# Patient Record
Sex: FEMALE | Race: WHITE | NOT HISPANIC OR LATINO | Employment: OTHER | URBAN - METROPOLITAN AREA
[De-identification: names, ages, dates, MRNs, and addresses within clinical notes are randomized per-mention and may not be internally consistent; named-entity substitution may affect disease eponyms.]

---

## 2017-11-28 ENCOUNTER — TELEPHONE (OUTPATIENT)
Dept: PSYCHIATRY | Facility: CLINIC | Age: 62
End: 2017-11-28

## 2018-01-05 ENCOUNTER — TELEPHONE (OUTPATIENT)
Dept: PSYCHIATRY | Facility: CLINIC | Age: 63
End: 2018-01-05

## 2019-10-16 DIAGNOSIS — R39.15 URINARY URGENCY: ICD-10-CM

## 2019-10-16 DIAGNOSIS — N39.41 URGE INCONTINENCE: Primary | ICD-10-CM

## 2019-10-16 DIAGNOSIS — N39.3 FEMALE STRESS INCONTINENCE: ICD-10-CM

## 2020-01-10 ENCOUNTER — CLINICAL SUPPORT (OUTPATIENT)
Dept: REHABILITATION | Facility: HOSPITAL | Age: 65
End: 2020-01-10
Payer: MEDICARE

## 2020-01-10 DIAGNOSIS — R29.898 WEAKNESS OF BOTH HIPS: ICD-10-CM

## 2020-01-10 PROCEDURE — 97161 PT EVAL LOW COMPLEX 20 MIN: CPT | Mod: PN

## 2020-01-10 PROCEDURE — 97530 THERAPEUTIC ACTIVITIES: CPT | Mod: PN

## 2020-01-10 PROCEDURE — 97112 NEUROMUSCULAR REEDUCATION: CPT | Mod: PN

## 2020-03-23 ENCOUNTER — DOCUMENTATION ONLY (OUTPATIENT)
Dept: REHABILITATION | Facility: OTHER | Age: 65
End: 2020-03-23

## 2020-03-23 PROBLEM — R29.898 WEAKNESS OF BOTH HIPS: Status: RESOLVED | Noted: 2020-01-10 | Resolved: 2020-03-23

## 2020-03-23 NOTE — PROGRESS NOTES
Outpatient Therapy Discharge Summary     Name: Sandi Dobson  [facility] Number: [id number]    Therapy Diagnosis: No diagnosis found.  Physician: No ref. provider found    Physician Orders: PT Eval and Treat   Medical Diagnosis:   N39.3 (ICD-10-CM) - Female stress incontinence   N39.41 (ICD-10-CM) - Urge incontinence   R39.15 (ICD-10-CM) - Urgency of urination   Evaluation Date: 1/10/2020        Date of Last visit: 1/10/20  Total Visits Received: 1  Cancelled Visits: 0  No Show Visits: 2    Assessment    Goals: attended eval only    Discharge reason: did not return for therapy    Plan   This patient is discharged from PT

## 2020-06-16 LAB
ALBUMIN/GLOBULIN RATIO: 1.4
ALBUMIN: 4.3
ALP ISOS SERPL LEV INH-CCNC: 101 U/L
ALT: 16
AST: 20
BILIRUBIN TOTAL: 0.3
BUN/CREAT SERPL: 26 (ref ?–26)
CALCIUM SERPL-MCNC: 9.3 MG/DL
CHLORIDE: 106 MMOL/L
CHOLESTEROL, TOTAL: 207 (ref ?–207)
CO2 SERPL-SCNC: 25 MMOL/L
CREAT SERPL-MCNC: 1.2 MG/DL (ref ?–1.2)
EST. GFR  (NON AFRICAN AMERICAN): 48 ML/MIN/1.73 M2 (ref 48–?)
GLOBULIN: 3.1
GLUCOSE: 70
HBA1C MFR BLD: 6.1 % (ref ?–6.1)
HDLC SERPL-MCNC: 53 MG/DL
LDLC SERPL CALC-MCNC: 127 MG/DL (ref ?–127)
NON HDL CHOL (CALC): 154 (ref ?–154)
POTASSIUM: 4.5 MMOL/L
PROTEIN TOTAL: 7.4
SODIUM: 142 MMOL/L
TRIGL SERPL-MCNC: 157 MG/DL (ref ?–157)
UREA NITROGEN (BUN): 31 (ref ?–31)

## 2021-01-15 ENCOUNTER — PATIENT OUTREACH (OUTPATIENT)
Dept: ADMINISTRATIVE | Facility: HOSPITAL | Age: 66
End: 2021-01-15

## 2021-01-15 DIAGNOSIS — Z12.39 ENCOUNTER FOR SCREENING FOR MALIGNANT NEOPLASM OF BREAST, UNSPECIFIED SCREENING MODALITY: Primary | ICD-10-CM

## 2021-01-15 DIAGNOSIS — Z11.59 NEED FOR HEPATITIS C SCREENING TEST: ICD-10-CM

## 2021-01-15 DIAGNOSIS — Z12.31 ENCOUNTER FOR SCREENING MAMMOGRAM FOR MALIGNANT NEOPLASM OF BREAST: ICD-10-CM

## 2021-01-15 DIAGNOSIS — M81.0 OSTEOPOROSIS, UNSPECIFIED OSTEOPOROSIS TYPE, UNSPECIFIED PATHOLOGICAL FRACTURE PRESENCE: ICD-10-CM

## 2021-01-19 ENCOUNTER — TELEPHONE (OUTPATIENT)
Dept: FAMILY MEDICINE | Facility: CLINIC | Age: 66
End: 2021-01-19

## 2021-01-29 ENCOUNTER — LAB VISIT (OUTPATIENT)
Dept: LAB | Facility: HOSPITAL | Age: 66
End: 2021-01-29
Attending: INTERNAL MEDICINE
Payer: MEDICARE

## 2021-01-29 ENCOUNTER — OFFICE VISIT (OUTPATIENT)
Dept: FAMILY MEDICINE | Facility: CLINIC | Age: 66
End: 2021-01-29
Payer: MEDICARE

## 2021-01-29 VITALS
SYSTOLIC BLOOD PRESSURE: 120 MMHG | HEART RATE: 75 BPM | DIASTOLIC BLOOD PRESSURE: 60 MMHG | TEMPERATURE: 98 F | HEIGHT: 72 IN | OXYGEN SATURATION: 96 %

## 2021-01-29 DIAGNOSIS — E11.65 UNCONTROLLED TYPE 2 DIABETES MELLITUS WITH HYPERGLYCEMIA: ICD-10-CM

## 2021-01-29 DIAGNOSIS — Z86.010 HISTORY OF COLONIC POLYPS: ICD-10-CM

## 2021-01-29 DIAGNOSIS — F39 MOOD DISORDER: ICD-10-CM

## 2021-01-29 DIAGNOSIS — I10 ESSENTIAL HYPERTENSION: ICD-10-CM

## 2021-01-29 DIAGNOSIS — E11.65 UNCONTROLLED TYPE 2 DIABETES MELLITUS WITH HYPERGLYCEMIA: Primary | ICD-10-CM

## 2021-01-29 PROBLEM — Z86.0100 HISTORY OF COLONIC POLYPS: Status: ACTIVE | Noted: 2021-01-29

## 2021-01-29 LAB
BASOPHILS # BLD AUTO: 0.02 K/UL (ref 0–0.2)
BASOPHILS NFR BLD: 0.3 % (ref 0–1.9)
DIFFERENTIAL METHOD: ABNORMAL
EOSINOPHIL # BLD AUTO: 0.1 K/UL (ref 0–0.5)
EOSINOPHIL NFR BLD: 1.5 % (ref 0–8)
ERYTHROCYTE [DISTWIDTH] IN BLOOD BY AUTOMATED COUNT: 14.1 % (ref 11.5–14.5)
HCT VFR BLD AUTO: 42.3 % (ref 37–48.5)
HGB BLD-MCNC: 12.6 G/DL (ref 12–16)
IMM GRANULOCYTES # BLD AUTO: 0.01 K/UL (ref 0–0.04)
IMM GRANULOCYTES NFR BLD AUTO: 0.2 % (ref 0–0.5)
LYMPHOCYTES # BLD AUTO: 1.9 K/UL (ref 1–4.8)
LYMPHOCYTES NFR BLD: 32 % (ref 18–48)
MCH RBC QN AUTO: 27.3 PG (ref 27–31)
MCHC RBC AUTO-ENTMCNC: 29.8 G/DL (ref 32–36)
MCV RBC AUTO: 92 FL (ref 82–98)
MONOCYTES # BLD AUTO: 0.5 K/UL (ref 0.3–1)
MONOCYTES NFR BLD: 9 % (ref 4–15)
NEUTROPHILS # BLD AUTO: 3.4 K/UL (ref 1.8–7.7)
NEUTROPHILS NFR BLD: 57 % (ref 38–73)
NRBC BLD-RTO: 0 /100 WBC
PLATELET # BLD AUTO: 288 K/UL (ref 150–350)
PMV BLD AUTO: 10.6 FL (ref 9.2–12.9)
RBC # BLD AUTO: 4.61 M/UL (ref 4–5.4)
WBC # BLD AUTO: 6 K/UL (ref 3.9–12.7)

## 2021-01-29 PROCEDURE — 80061 LIPID PANEL: CPT

## 2021-01-29 PROCEDURE — 84443 ASSAY THYROID STIM HORMONE: CPT

## 2021-01-29 PROCEDURE — 83036 HEMOGLOBIN GLYCOSYLATED A1C: CPT

## 2021-01-29 PROCEDURE — 99213 OFFICE O/P EST LOW 20 MIN: CPT | Mod: PBBFAC,PO | Performed by: INTERNAL MEDICINE

## 2021-01-29 PROCEDURE — 85025 COMPLETE CBC W/AUTO DIFF WBC: CPT

## 2021-01-29 PROCEDURE — 80053 COMPREHEN METABOLIC PANEL: CPT

## 2021-01-29 PROCEDURE — 99205 PR OFFICE/OUTPT VISIT, NEW, LEVL V, 60-74 MIN: ICD-10-PCS | Mod: S$PBB,,, | Performed by: INTERNAL MEDICINE

## 2021-01-29 PROCEDURE — 99205 OFFICE O/P NEW HI 60 MIN: CPT | Mod: S$PBB,,, | Performed by: INTERNAL MEDICINE

## 2021-01-29 PROCEDURE — 99999 PR PBB SHADOW E&M-EST. PATIENT-LVL III: CPT | Mod: PBBFAC,,, | Performed by: INTERNAL MEDICINE

## 2021-01-29 PROCEDURE — 99999 PR PBB SHADOW E&M-EST. PATIENT-LVL III: ICD-10-PCS | Mod: PBBFAC,,, | Performed by: INTERNAL MEDICINE

## 2021-01-29 PROCEDURE — 36415 COLL VENOUS BLD VENIPUNCTURE: CPT | Mod: PO

## 2021-01-29 RX ORDER — VENLAFAXINE HYDROCHLORIDE 150 MG/1
CAPSULE, EXTENDED RELEASE ORAL
Qty: 270 CAPSULE | Refills: 12 | Status: SHIPPED | OUTPATIENT
Start: 2021-01-29

## 2021-01-29 RX ORDER — AMLODIPINE BESYLATE 5 MG/1
5 TABLET ORAL DAILY PRN
Qty: 90 TABLET | Refills: 90 | Status: SHIPPED | OUTPATIENT
Start: 2021-01-29 | End: 2021-01-29 | Stop reason: SDUPTHER

## 2021-01-29 RX ORDER — VENLAFAXINE HYDROCHLORIDE 150 MG/1
150 CAPSULE, EXTENDED RELEASE ORAL
COMMUNITY
End: 2021-01-29 | Stop reason: SDUPTHER

## 2021-01-29 RX ORDER — GLIPIZIDE 10 MG/1
10 TABLET ORAL DAILY
Qty: 90 TABLET | Refills: 12 | Status: SHIPPED | OUTPATIENT
Start: 2021-01-29 | End: 2022-11-14 | Stop reason: SDUPTHER

## 2021-01-29 RX ORDER — AMLODIPINE BESYLATE 5 MG/1
5 TABLET ORAL DAILY PRN
Qty: 90 TABLET | Refills: 90 | Status: SHIPPED | OUTPATIENT
Start: 2021-01-29 | End: 2023-05-26

## 2021-01-29 RX ORDER — GLIPIZIDE 10 MG/1
10 TABLET ORAL DAILY
Qty: 90 TABLET | Refills: 12 | Status: SHIPPED | OUTPATIENT
Start: 2021-01-29 | End: 2021-01-29 | Stop reason: SDUPTHER

## 2021-01-29 RX ORDER — OMEPRAZOLE 10 MG/1
20 CAPSULE, DELAYED RELEASE ORAL
Qty: 120 CAPSULE | Refills: 11 | Status: SHIPPED | OUTPATIENT
Start: 2021-01-29 | End: 2023-03-16

## 2021-01-29 RX ORDER — PIOGLITAZONEHYDROCHLORIDE 30 MG/1
30 TABLET ORAL DAILY
Qty: 90 TABLET | Refills: 0 | Status: SHIPPED | OUTPATIENT
Start: 2021-01-29 | End: 2022-11-14 | Stop reason: SDUPTHER

## 2021-01-29 RX ORDER — GLIPIZIDE 10 MG/1
10 TABLET ORAL DAILY
COMMUNITY
Start: 2018-06-01 | End: 2021-01-29 | Stop reason: SDUPTHER

## 2021-01-29 RX ORDER — VENLAFAXINE HYDROCHLORIDE 150 MG/1
CAPSULE, EXTENDED RELEASE ORAL
Qty: 270 CAPSULE | Refills: 12 | Status: SHIPPED | OUTPATIENT
Start: 2021-01-29 | End: 2021-01-29 | Stop reason: SDUPTHER

## 2021-01-29 RX ORDER — PIOGLITAZONEHYDROCHLORIDE 30 MG/1
30 TABLET ORAL DAILY
Qty: 90 TABLET | Refills: 0 | Status: SHIPPED | OUTPATIENT
Start: 2021-01-29 | End: 2021-01-29 | Stop reason: SDUPTHER

## 2021-01-29 RX ORDER — OMEPRAZOLE 10 MG/1
20 CAPSULE, DELAYED RELEASE ORAL
Qty: 120 CAPSULE | Refills: 11 | Status: SHIPPED | OUTPATIENT
Start: 2021-01-29 | End: 2021-01-29 | Stop reason: SDUPTHER

## 2021-01-29 RX ORDER — AMLODIPINE BESYLATE 5 MG/1
5 TABLET ORAL DAILY PRN
COMMUNITY
End: 2021-01-29 | Stop reason: SDUPTHER

## 2021-01-29 RX ORDER — PIOGLITAZONEHYDROCHLORIDE 30 MG/1
30 TABLET ORAL DAILY
COMMUNITY
Start: 2018-06-01 | End: 2021-01-29 | Stop reason: SDUPTHER

## 2021-01-29 RX ORDER — METFORMIN HYDROCHLORIDE 500 MG/1
TABLET ORAL
Qty: 360 TABLET | Refills: 12 | Status: SHIPPED | OUTPATIENT
Start: 2021-01-29

## 2021-01-29 RX ORDER — METFORMIN HYDROCHLORIDE 500 MG/1
TABLET ORAL
COMMUNITY
Start: 2021-01-05 | End: 2021-01-29 | Stop reason: SDUPTHER

## 2021-01-29 RX ORDER — METFORMIN HYDROCHLORIDE 500 MG/1
TABLET ORAL
Qty: 360 TABLET | Refills: 12 | Status: SHIPPED | OUTPATIENT
Start: 2021-01-29 | End: 2021-01-29 | Stop reason: SDUPTHER

## 2021-01-30 LAB
ALBUMIN SERPL BCP-MCNC: 3.8 G/DL (ref 3.5–5.2)
ALP SERPL-CCNC: 91 U/L (ref 55–135)
ALT SERPL W/O P-5'-P-CCNC: 23 U/L (ref 10–44)
ANION GAP SERPL CALC-SCNC: 14 MMOL/L (ref 8–16)
AST SERPL-CCNC: 28 U/L (ref 10–40)
BILIRUB SERPL-MCNC: 0.3 MG/DL (ref 0.1–1)
BUN SERPL-MCNC: 25 MG/DL (ref 8–23)
CALCIUM SERPL-MCNC: 9.5 MG/DL (ref 8.7–10.5)
CHLORIDE SERPL-SCNC: 104 MMOL/L (ref 95–110)
CHOLEST SERPL-MCNC: 193 MG/DL (ref 120–199)
CHOLEST/HDLC SERPL: 3.9 {RATIO} (ref 2–5)
CO2 SERPL-SCNC: 22 MMOL/L (ref 23–29)
CREAT SERPL-MCNC: 1.1 MG/DL (ref 0.5–1.4)
EST. GFR  (AFRICAN AMERICAN): >60 ML/MIN/1.73 M^2
EST. GFR  (NON AFRICAN AMERICAN): 52.4 ML/MIN/1.73 M^2
ESTIMATED AVG GLUCOSE: 134 MG/DL (ref 68–131)
GLUCOSE SERPL-MCNC: 75 MG/DL (ref 70–110)
HBA1C MFR BLD: 6.3 % (ref 4–5.6)
HDLC SERPL-MCNC: 50 MG/DL (ref 40–75)
HDLC SERPL: 25.9 % (ref 20–50)
LDLC SERPL CALC-MCNC: 113.6 MG/DL (ref 63–159)
NONHDLC SERPL-MCNC: 143 MG/DL
POTASSIUM SERPL-SCNC: 4.4 MMOL/L (ref 3.5–5.1)
PROT SERPL-MCNC: 8.1 G/DL (ref 6–8.4)
SODIUM SERPL-SCNC: 140 MMOL/L (ref 136–145)
TRIGL SERPL-MCNC: 147 MG/DL (ref 30–150)
TSH SERPL DL<=0.005 MIU/L-ACNC: 1.2 UIU/ML (ref 0.4–4)

## 2021-04-12 ENCOUNTER — PATIENT MESSAGE (OUTPATIENT)
Dept: ADMINISTRATIVE | Facility: HOSPITAL | Age: 66
End: 2021-04-12

## 2021-04-16 ENCOUNTER — PATIENT MESSAGE (OUTPATIENT)
Dept: RESEARCH | Facility: HOSPITAL | Age: 66
End: 2021-04-16

## 2021-09-25 ENCOUNTER — NURSE TRIAGE (OUTPATIENT)
Dept: ADMINISTRATIVE | Facility: CLINIC | Age: 66
End: 2021-09-25

## 2021-09-27 RX ORDER — NITROFURANTOIN 25; 75 MG/1; MG/1
100 CAPSULE ORAL 2 TIMES DAILY
Qty: 10 CAPSULE | Refills: 0 | Status: SHIPPED | OUTPATIENT
Start: 2021-09-27 | End: 2021-09-27 | Stop reason: SDUPTHER

## 2021-09-27 RX ORDER — NITROFURANTOIN 25; 75 MG/1; MG/1
100 CAPSULE ORAL 2 TIMES DAILY
Qty: 10 CAPSULE | Refills: 0 | Status: SHIPPED | OUTPATIENT
Start: 2021-09-27 | End: 2021-10-02

## 2021-12-08 ENCOUNTER — PATIENT MESSAGE (OUTPATIENT)
Dept: ADMINISTRATIVE | Facility: HOSPITAL | Age: 66
End: 2021-12-08
Payer: MEDICARE

## 2022-04-19 ENCOUNTER — PES CALL (OUTPATIENT)
Dept: ADMINISTRATIVE | Facility: CLINIC | Age: 67
End: 2022-04-19
Payer: MEDICARE

## 2022-04-22 ENCOUNTER — TELEPHONE (OUTPATIENT)
Dept: ADMINISTRATIVE | Facility: CLINIC | Age: 67
End: 2022-04-22
Payer: MEDICARE

## 2022-04-27 ENCOUNTER — PES CALL (OUTPATIENT)
Dept: ADMINISTRATIVE | Facility: CLINIC | Age: 67
End: 2022-04-27
Payer: MEDICARE

## 2022-05-09 ENCOUNTER — PES CALL (OUTPATIENT)
Dept: ADMINISTRATIVE | Facility: CLINIC | Age: 67
End: 2022-05-09
Payer: MEDICARE

## 2022-05-23 NOTE — PROGRESS NOTES
Subjective:   Patient ID:  Sandi Dobson is a 67 y.o. female who presents for follow-up of Establish Care, Follow-up, and Abnormal ECG      Assessment/Plan:     1. RBBB - asymptomatic.  Likely due to longstanding obesity and restictive lung pattern but could be congenital per history.  Will check ECHO.     2. Uncontrolled type 2 diabetes mellitus with hyperglycemia - enroll in Dig DM.  Currently not on a statin.  Will likely start after enrollment.     3. Severe obesity (BMI 35.0-39.9) with comorbidity - chronic - weight loss has been a challenge.  Will work on weight loss in Dig Dm program.               Orders Placed This Encounter   Procedures    Diabetes Digital Medicine (DDMP) Enrollment Order    Echo Saline Bubble? No           ___________________________________________________________________________________________    HPI:   Pt is a kind 68 y/o woman here for an abnormal ECG.  She was enrolling in a research trial and an ECG was performed that demonstrated NSR and a RBBB.  Pt has DM and BMI~40.  She states that many years ago she was told her ECG was abnormal and she might have a congenital problem.  She doesn't recall any specifics and believes the workup was benign. She denies any CP.  She uses a wheelchair to go longer distances due to arthritis.  Sleeps on an incline but no c/o of orthopnea.  No SCOTT.  Currently not on a statin.                 Vitals:    05/24/22 0833   BP: (!) 145/70   BP Location: Left arm   Patient Position: Sitting   BP Method: Large (Automatic)   Pulse: 89   SpO2: (!) 93%   Weight: 126.9 kg (279 lb 12.2 oz)   Height: 6' (1.829 m)     Body mass index is 37.94 kg/m².  CrCl cannot be calculated (Patient's most recent lab result is older than the maximum 7 days allowed.).    Lab Results   Component Value Date     01/29/2021     06/16/2020    K 4.4 01/29/2021    K 4.5 06/16/2020     01/29/2021     06/16/2020    CO2 22 (L) 01/29/2021    BUN 25 (H) 01/29/2021     CREATININE 1.1 01/29/2021    CREATININE 1.2 06/16/2020    GLU 75 01/29/2021    HGBA1C 6.3 (H) 01/29/2021    HGBA1C 6.1 06/16/2020    AST 28 01/29/2021    ALT 23 01/29/2021    ALT 16 06/16/2020    ALBUMIN 3.8 01/29/2021    ALBUMIN 4.3 06/16/2020    PROT 8.1 01/29/2021    BILITOT 0.3 01/29/2021    WBC 6.00 01/29/2021    HGB 12.6 01/29/2021    HCT 42.3 01/29/2021    MCV 92 01/29/2021     01/29/2021    TSH 1.196 01/29/2021    CHOL 193 01/29/2021    HDL 50 01/29/2021    HDL 53 06/16/2020    LDLCALC 113.6 01/29/2021    TRIG 147 01/29/2021    TRIG 157 06/16/2020       Current Outpatient Medications   Medication Sig    amLODIPine (NORVASC) 5 MG tablet Take 1 tablet (5 mg total) by mouth daily as needed.    ferrous fumarate-vitamin C 200 mg (65 mg iron)-25 mg TbSR Take by mouth.    glipiZIDE (GLUCOTROL) 10 MG tablet Take 1 tablet (10 mg total) by mouth once daily.    metFORMIN (GLUCOPHAGE) 500 MG tablet 2 pills BID2 pills BID    mirabegron (MYRBETRIQ) 25 mg Tb24 ER tablet Take 25 mg by mouth once daily.    pioglitazone (ACTOS) 30 MG tablet Take 1 tablet (30 mg total) by mouth once daily.    venlafaxine (EFFEXOR-XR) 150 MG Cp24 Take  3 at night    omeprazole (PRILOSEC) 10 MG capsule Take 2 capsules (20 mg total) by mouth 2 (two) times daily before meals.     No current facility-administered medications for this visit.       Review of Systems   Constitutional: Negative for malaise/fatigue.   Eyes: Negative for visual disturbance.   Cardiovascular: Negative for chest pain, claudication, dyspnea on exertion, irregular heartbeat, near-syncope, orthopnea and palpitations.   Respiratory: Negative for shortness of breath and sleep disturbances due to breathing.    Musculoskeletal: Positive for arthritis and joint pain. Negative for muscle cramps, muscle weakness and myalgias.   Gastrointestinal: Negative for abdominal pain and heartburn.   Genitourinary: Negative for nocturia.   Neurological: Negative for difficulty  with concentration, excessive daytime sleepiness, light-headedness, loss of balance, paresthesias and vertigo.       Objective:   Physical Exam  Constitutional:       Appearance: She is well-developed.   HENT:      Head: Normocephalic and atraumatic.   Cardiovascular:      Rate and Rhythm: Normal rate and regular rhythm.      Heart sounds: Murmur heard.    Medium-pitched blowing holosystolic murmur is present with a grade of 1/6 at the upper right sternal border.    No friction rub. No gallop.   Pulmonary:      Effort: Pulmonary effort is normal.      Breath sounds: Normal breath sounds.   Neurological:      Mental Status: She is alert and oriented to person, place, and time.   Psychiatric:         Behavior: Behavior normal.         Thought Content: Thought content normal.         Judgment: Judgment normal.

## 2022-05-24 ENCOUNTER — OFFICE VISIT (OUTPATIENT)
Dept: CARDIOLOGY | Facility: CLINIC | Age: 67
End: 2022-05-24
Payer: MEDICARE

## 2022-05-24 ENCOUNTER — PATIENT MESSAGE (OUTPATIENT)
Dept: ADMINISTRATIVE | Facility: OTHER | Age: 67
End: 2022-05-24
Payer: COMMERCIAL

## 2022-05-24 ENCOUNTER — PATIENT MESSAGE (OUTPATIENT)
Dept: CARDIOLOGY | Facility: CLINIC | Age: 67
End: 2022-05-24

## 2022-05-24 VITALS
SYSTOLIC BLOOD PRESSURE: 145 MMHG | DIASTOLIC BLOOD PRESSURE: 70 MMHG | OXYGEN SATURATION: 93 % | HEART RATE: 89 BPM | WEIGHT: 279.75 LBS | HEIGHT: 72 IN | BODY MASS INDEX: 37.89 KG/M2

## 2022-05-24 DIAGNOSIS — E11.65 UNCONTROLLED TYPE 2 DIABETES MELLITUS WITH HYPERGLYCEMIA: ICD-10-CM

## 2022-05-24 DIAGNOSIS — I45.10 RBBB: Primary | ICD-10-CM

## 2022-05-24 DIAGNOSIS — E66.01 SEVERE OBESITY (BMI 35.0-39.9) WITH COMORBIDITY: ICD-10-CM

## 2022-05-24 PROCEDURE — 99204 PR OFFICE/OUTPT VISIT, NEW, LEVL IV, 45-59 MIN: ICD-10-PCS | Mod: S$PBB,,, | Performed by: INTERNAL MEDICINE

## 2022-05-24 PROCEDURE — 99999 PR PBB SHADOW E&M-EST. PATIENT-LVL IV: CPT | Mod: PBBFAC,,, | Performed by: INTERNAL MEDICINE

## 2022-05-24 PROCEDURE — 99214 OFFICE O/P EST MOD 30 MIN: CPT | Mod: PBBFAC | Performed by: INTERNAL MEDICINE

## 2022-05-24 PROCEDURE — 99204 OFFICE O/P NEW MOD 45 MIN: CPT | Mod: S$PBB,,, | Performed by: INTERNAL MEDICINE

## 2022-05-24 PROCEDURE — 99999 PR PBB SHADOW E&M-EST. PATIENT-LVL IV: ICD-10-PCS | Mod: PBBFAC,,, | Performed by: INTERNAL MEDICINE

## 2022-05-24 RX ORDER — MIRABEGRON 25 MG/1
25 TABLET, FILM COATED, EXTENDED RELEASE ORAL DAILY
COMMUNITY
End: 2022-10-20

## 2022-05-31 ENCOUNTER — PATIENT MESSAGE (OUTPATIENT)
Dept: ADMINISTRATIVE | Facility: HOSPITAL | Age: 67
End: 2022-05-31
Payer: COMMERCIAL

## 2022-06-15 ENCOUNTER — HOSPITAL ENCOUNTER (OUTPATIENT)
Dept: CARDIOLOGY | Facility: HOSPITAL | Age: 67
Discharge: HOME | End: 2022-06-15
Attending: INTERNAL MEDICINE
Payer: MEDICARE

## 2022-06-15 VITALS
SYSTOLIC BLOOD PRESSURE: 140 MMHG | DIASTOLIC BLOOD PRESSURE: 70 MMHG | WEIGHT: 279 LBS | HEART RATE: 70 BPM | HEIGHT: 72 IN | BODY MASS INDEX: 37.79 KG/M2

## 2022-06-15 DIAGNOSIS — I45.10 RBBB: ICD-10-CM

## 2022-06-15 LAB
ASCENDING AORTA: 3.5 CM
AV INDEX (PROSTH): 0.78
AV MEAN GRADIENT: 5 MMHG
AV PEAK GRADIENT: 10 MMHG
AV VALVE AREA: 2.85 CM2
AV VELOCITY RATIO: 0.59
BSA FOR ECHO PROCEDURE: 2.54 M2
CV ECHO LV RWT: 0.53 CM
DOP CALC AO PEAK VEL: 1.57 M/S
DOP CALC AO VTI: 27.83 CM
DOP CALC LVOT AREA: 3.6 CM2
DOP CALC LVOT DIAMETER: 2.15 CM
DOP CALC LVOT PEAK VEL: 0.93 M/S
DOP CALC LVOT STROKE VOLUME: 79.21 CM3
DOP CALCLVOT PEAK VEL VTI: 21.83 CM
E WAVE DECELERATION TIME: 403.74 MSEC
E/A RATIO: 0.54
E/E' RATIO: 9.17 M/S
ECHO LV POSTERIOR WALL: 1.19 CM (ref 0.6–1.1)
EJECTION FRACTION: 55 %
FRACTIONAL SHORTENING: 31 % (ref 28–44)
INTERVENTRICULAR SEPTUM: 1.41 CM (ref 0.6–1.1)
IVRT: 65.65 MSEC
LA MAJOR: 5.48 CM
LA MINOR: 4.81 CM
LA WIDTH: 4.51 CM
LEFT ATRIUM SIZE: 4.3 CM
LEFT ATRIUM VOLUME INDEX MOD: 40.6 ML/M2
LEFT ATRIUM VOLUME INDEX: 34.5 ML/M2
LEFT ATRIUM VOLUME MOD: 99.38 CM3
LEFT ATRIUM VOLUME: 84.45 CM3
LEFT INTERNAL DIMENSION IN SYSTOLE: 3.12 CM (ref 2.1–4)
LEFT VENTRICLE DIASTOLIC VOLUME INDEX: 37.89 ML/M2
LEFT VENTRICLE DIASTOLIC VOLUME: 92.83 ML
LEFT VENTRICLE MASS INDEX: 91 G/M2
LEFT VENTRICLE SYSTOLIC VOLUME INDEX: 15.7 ML/M2
LEFT VENTRICLE SYSTOLIC VOLUME: 38.49 ML
LEFT VENTRICULAR INTERNAL DIMENSION IN DIASTOLE: 4.51 CM (ref 3.5–6)
LEFT VENTRICULAR MASS: 223.32 G
LV LATERAL E/E' RATIO: 7.86 M/S
LV SEPTAL E/E' RATIO: 11 M/S
MV A" WAVE DURATION": 17.41 MSEC
MV PEAK A VEL: 1.01 M/S
MV PEAK E VEL: 0.55 M/S
MV STENOSIS PRESSURE HALF TIME: 117.09 MS
MV VALVE AREA P 1/2 METHOD: 1.88 CM2
PISA TR MAX VEL: 2.48 M/S
PULM VEIN S/D RATIO: 1.53
PV PEAK D VEL: 0.32 M/S
PV PEAK S VEL: 0.49 M/S
RA MAJOR: 4.61 CM
RA WIDTH: 4.28 CM
RIGHT VENTRICULAR END-DIASTOLIC DIMENSION: 3.58 CM
RV TISSUE DOPPLER FREE WALL SYSTOLIC VELOCITY 1 (APICAL 4 CHAMBER VIEW): 11.98 CM/S
SINUS: 3.63 CM
STJ: 3.43 CM
TDI LATERAL: 0.07 M/S
TDI SEPTAL: 0.05 M/S
TDI: 0.06 M/S
TR MAX PG: 25 MMHG
TRICUSPID ANNULAR PLANE SYSTOLIC EXCURSION: 2.06 CM

## 2022-06-15 PROCEDURE — 93306 TTE W/DOPPLER COMPLETE: CPT | Mod: 26,,, | Performed by: INTERNAL MEDICINE

## 2022-06-15 PROCEDURE — 93306 TTE W/DOPPLER COMPLETE: CPT

## 2022-06-15 PROCEDURE — 93306 ECHO (CUPID ONLY): ICD-10-PCS | Mod: 26,,, | Performed by: INTERNAL MEDICINE

## 2022-06-20 NOTE — PROGRESS NOTES
Please contact pt and inform that echo looks good without any major abnormality.  Please sign up for digtal program

## 2022-06-21 ENCOUNTER — PES CALL (OUTPATIENT)
Dept: ADMINISTRATIVE | Facility: CLINIC | Age: 67
End: 2022-06-21
Payer: MEDICARE

## 2022-06-30 ENCOUNTER — PES CALL (OUTPATIENT)
Dept: ADMINISTRATIVE | Facility: CLINIC | Age: 67
End: 2022-06-30
Payer: MEDICARE

## 2022-07-11 ENCOUNTER — PATIENT MESSAGE (OUTPATIENT)
Dept: ADMINISTRATIVE | Facility: CLINIC | Age: 67
End: 2022-07-11
Payer: MEDICARE

## 2022-07-11 ENCOUNTER — TELEPHONE (OUTPATIENT)
Dept: ADMINISTRATIVE | Facility: CLINIC | Age: 67
End: 2022-07-11
Payer: MEDICARE

## 2022-07-11 NOTE — TELEPHONE ENCOUNTER
Called pt; no answer; could not leave a message due to voice mail not set up; I was calling to confirm pt's virtual EAWV appointment on 7/13/22 at 1:00pm and see if she needed any help with the mychart or e-pre check; sent message through portal

## 2022-07-13 ENCOUNTER — PATIENT MESSAGE (OUTPATIENT)
Dept: ADMINISTRATIVE | Facility: CLINIC | Age: 67
End: 2022-07-13
Payer: MEDICARE

## 2022-07-13 ENCOUNTER — TELEPHONE (OUTPATIENT)
Dept: ADMINISTRATIVE | Facility: CLINIC | Age: 67
End: 2022-07-13
Payer: MEDICARE

## 2022-07-13 ENCOUNTER — PES CALL (OUTPATIENT)
Dept: ADMINISTRATIVE | Facility: CLINIC | Age: 67
End: 2022-07-13
Payer: MEDICARE

## 2022-07-13 NOTE — TELEPHONE ENCOUNTER
Called pt; no answer; could not leave a message due to voice mail not being set up; I was calling to confirm pt's virtual EAWV appointment for today at 1:00pm and see if she needed any help with the mychart or e-pre check

## 2022-07-13 NOTE — TELEPHONE ENCOUNTER
Called pt; informed pt I was just making a reminder call for her virtual visit today at 1:00pm and to see if she needed any help; walked pt through completing e-pre check; went over steps with pt on her to sign in and start video; pt tested her device which passed the test and pt stated she had no more questions or concerns; gave pt my name and number in case she runs into any issues before her appt time; pt informed to login 15 minutes prior to appt time

## 2022-07-13 NOTE — TELEPHONE ENCOUNTER
Pt called in and stated she forgot she has another doctors appt today and needs to reschedule her virtual eawv appt because she can not do it today; appt rescheduled to 7/27/22 per pt request

## 2022-07-25 ENCOUNTER — TELEPHONE (OUTPATIENT)
Dept: ADMINISTRATIVE | Facility: CLINIC | Age: 67
End: 2022-07-25
Payer: MEDICARE

## 2022-07-27 ENCOUNTER — TELEPHONE (OUTPATIENT)
Dept: ADMINISTRATIVE | Facility: CLINIC | Age: 67
End: 2022-07-27
Payer: MEDICARE

## 2022-07-27 ENCOUNTER — OFFICE VISIT (OUTPATIENT)
Dept: HOME HEALTH SERVICES | Facility: CLINIC | Age: 67
End: 2022-07-27
Payer: MEDICARE

## 2022-07-27 DIAGNOSIS — Z12.11 ENCOUNTER FOR SCREENING COLONOSCOPY: ICD-10-CM

## 2022-07-27 DIAGNOSIS — N76.3 CHRONIC VULVITIS: ICD-10-CM

## 2022-07-27 DIAGNOSIS — E11.65 UNCONTROLLED TYPE 2 DIABETES MELLITUS WITH HYPERGLYCEMIA: ICD-10-CM

## 2022-07-27 DIAGNOSIS — F39 MOOD DISORDER: ICD-10-CM

## 2022-07-27 DIAGNOSIS — L29.2 VULVAR PRURITUS: Primary | ICD-10-CM

## 2022-07-27 DIAGNOSIS — R26.9 ABNORMALITY OF GAIT AND MOBILITY: ICD-10-CM

## 2022-07-27 DIAGNOSIS — N39.3 SUI (STRESS URINARY INCONTINENCE, FEMALE): ICD-10-CM

## 2022-07-27 DIAGNOSIS — Z12.31 SCREENING MAMMOGRAM, ENCOUNTER FOR: ICD-10-CM

## 2022-07-27 DIAGNOSIS — E11.9 TYPE 2 DIABETES MELLITUS WITHOUT COMPLICATION, WITHOUT LONG-TERM CURRENT USE OF INSULIN: ICD-10-CM

## 2022-07-27 DIAGNOSIS — Z86.010 HISTORY OF COLONIC POLYPS: ICD-10-CM

## 2022-07-27 DIAGNOSIS — I10 ESSENTIAL HYPERTENSION: ICD-10-CM

## 2022-07-27 DIAGNOSIS — Z99.3 DEPENDENCE ON WHEELCHAIR: ICD-10-CM

## 2022-07-27 DIAGNOSIS — Z78.0 POSTMENOPAUSAL STATE: ICD-10-CM

## 2022-07-27 DIAGNOSIS — E66.01 SEVERE OBESITY (BMI 35.0-39.9) WITH COMORBIDITY: ICD-10-CM

## 2022-07-27 DIAGNOSIS — N32.81 OVERACTIVE BLADDER: ICD-10-CM

## 2022-07-27 DIAGNOSIS — Z00.00 ENCOUNTER FOR PREVENTIVE HEALTH EXAMINATION: ICD-10-CM

## 2022-07-27 PROCEDURE — G0439 PPPS, SUBSEQ VISIT: HCPCS | Mod: 95,,, | Performed by: NURSE PRACTITIONER

## 2022-07-27 PROCEDURE — G0439 PR MEDICARE ANNUAL WELLNESS SUBSEQUENT VISIT: ICD-10-PCS | Mod: 95,,, | Performed by: NURSE PRACTITIONER

## 2022-07-27 NOTE — PROGRESS NOTES
The patient location is: [place]  The chief complaint leading to consultation is: awv    Visit type: audiovisual    Face to Face time with patient: 60  60 minutes of total time spent on the encounter, which includes face to face time and non-face to face time preparing to see the patient (eg, review of tests), Obtaining and/or reviewing separately obtained history, Documenting clinical information in the electronic or other health record, Independently interpreting results (not separately reported) and communicating results to the patient/family/caregiver, or Care coordination (not separately reported).         Each patient to whom he or she provides medical services by telemedicine is:  (1) informed of the relationship between the physician and patient and the respective role of any other health care provider with respect to management of the patient; and (2) notified that he or she may decline to receive medical services by telemedicine and may withdraw from such care at any time.    Notes:       Sandi Dobson presented for a  Medicare AWV and comprehensive Health Risk Assessment today. The following components were reviewed and updated:    · Medical history  · Family History  · Social history  · Allergies and Current Medications  · Health Risk Assessment  · Health Maintenance  · Care Team         ** See Completed Assessments for Annual Wellness Visit within the encounter summary.**         The following assessments were completed:  · Living Situation  · CAGE  · Depression Screening  · Fall Risk Assessment (MACH 10)  · Hearing Assessment(HHI)  · Cognitive Function Screening  · Nutrition Screening  · ADL Screening  · PAQ Screening      There were no vitals filed for this visit.  There is no height or weight on file to calculate BMI.  Physical Exam  Constitutional:       Appearance: Normal appearance.   Neurological:      Mental Status: She is alert.   Psychiatric:         Attention and Perception: Attention and  perception normal.         Mood and Affect: Mood and affect normal.         Speech: Speech normal.         Behavior: Behavior normal. Behavior is cooperative.         Thought Content: Thought content normal.         Cognition and Memory: Cognition and memory normal.         Judgment: Judgment normal.               Diagnoses and health risks identified today and associated recommendations/orders:    1. Mood disorder  Stable, followed by provider.     2. Dependence on wheelchair  Stable, followed by provider.     3. Abnormality of gait and mobility  Stable, followed by provider.     4. Encounter for preventive health examination    - DXA Bone Density Spine And Hip; Future  - Mammo Digital Screening Bilat; Future    5. Postmenopausal state    - DXA Bone Density Spine And Hip; Future    6. Screening mammogram, encounter for    - Mammo Digital Screening Bilat; Future    7. Vulvar pruritus  Stable, followed by provider.     8. Essential hypertension  Stable, followed by provider.     9. RUDY (stress urinary incontinence, female)  Stable, followed by provider.     10. Overactive bladder  Stable, followed by provider.     11. Chronic vulvitis  Stable, followed by provider.     12. Uncontrolled type 2 diabetes mellitus with hyperglycemia  Stable, followed by provider.     13. Severe obesity (BMI 35.0-39.9) with comorbidity  Stable, followed by provider.     14. Type 2 diabetes mellitus without complication, without long-term current use of insulin  Stable, followed by provider.     15. History of colonic polyps  Stable, followed by provider.     16. Encounter for screening colonoscopy  Stable, followed by provider.       Provided Sandi with a 5-10 year written screening schedule and personal prevention plan. Recommendations were developed using the USPSTF age appropriate recommendations. Education, counseling, and referrals were provided as needed. After Visit Summary printed and given to patient which includes a list of  additional screenings\tests needed.    Follow up in about 1 year (around 7/27/2023) for awv.    Cesar Ackerman NP  I offered to discuss advanced care planning, including how to pick a person who would make decisions for you if you were unable to make them for yourself, called a health care power of , and what kind of decisions you might make such as use of life sustaining treatments such as ventilators and tube feeding when faced with a life limiting illness recorded on a living will that they will need to know. (How you want to be cared for as you near the end of your natural life)     X Patient is interested in learning more about how to make advanced directives.  I provided them paperwork and offered to discuss this with them.

## 2022-07-27 NOTE — PATIENT INSTRUCTIONS
Counseling and Referral of Other Preventative  (Italic type indicates deductible and co-insurance are waived)    Patient Name: Sandi Dobson  Today's Date: 7/27/2022    Health Maintenance       Date Due Completion Date    Hepatitis C Screening Never done ---    COVID-19 Vaccine (1) Never done ---    Diabetes Urine Screening Never done ---    Pneumococcal Vaccines (Age 65+) (1 - PCV) Never done ---    Foot Exam Never done ---    TETANUS VACCINE Never done ---    Low Dose Statin Never done ---    DEXA Scan Never done ---    Shingles Vaccine (1 of 2) Never done ---    Mammogram 09/11/2020 9/11/2019    Eye Exam 11/04/2021 11/4/2020    Influenza Vaccine (1) 09/01/2022 10/13/2020    Hemoglobin A1c 01/13/2023 7/13/2022    Lipid Panel 03/09/2023 3/9/2022    Colorectal Cancer Screening 11/05/2029 11/5/2019        Orders Placed This Encounter   Procedures    DXA Bone Density Spine And Hip    Mammo Digital Screening Bilat     The following information is provided to all patients.  This information is to help you find resources for any of the problems found today that may be affecting your health:                Living healthy guide: www.[facility].[place].gov      Understanding Diabetes: www.diabetes.org      Eating healthy: www.cdc.gov/healthyweight      CDC home safety checklist: www.cdc.gov/steadi/patient.html      Agency on Aging: www.goea.[place].[facility]      Alcoholics anonymous (AA): www.aa.org      Physical Activity: www.bing.nih.gov/jp6thlv      Tobacco use: www.quitwithusla.org

## 2022-07-28 ENCOUNTER — TELEPHONE (OUTPATIENT)
Dept: HEMATOLOGY/ONCOLOGY | Facility: CLINIC | Age: 67
End: 2022-07-28
Payer: MEDICARE

## 2022-07-28 NOTE — TELEPHONE ENCOUNTER
----- Message from Dereck Reardon sent at 7/28/2022  1:45 PM CDT -----  Contact: Patient  The pt called and said that she would like to participate in the clinical cancer study.    Please call her back at [phone]

## 2022-09-07 DIAGNOSIS — E11.9 TYPE 2 DIABETES MELLITUS WITHOUT COMPLICATION, WITHOUT LONG-TERM CURRENT USE OF INSULIN: ICD-10-CM

## 2022-10-10 ENCOUNTER — PATIENT MESSAGE (OUTPATIENT)
Dept: ADMINISTRATIVE | Facility: HOSPITAL | Age: 67
End: 2022-10-10
Payer: MEDICARE

## 2022-10-20 ENCOUNTER — OFFICE VISIT (OUTPATIENT)
Dept: UROGYNECOLOGY | Facility: CLINIC | Age: 67
End: 2022-10-20
Payer: MEDICARE

## 2022-10-20 VITALS
SYSTOLIC BLOOD PRESSURE: 122 MMHG | HEIGHT: 72 IN | WEIGHT: 280 LBS | BODY MASS INDEX: 37.93 KG/M2 | DIASTOLIC BLOOD PRESSURE: 66 MMHG

## 2022-10-20 DIAGNOSIS — Z12.4 CERVICAL CANCER SCREENING: ICD-10-CM

## 2022-10-20 DIAGNOSIS — N90.89 VULVAR IRRITATION: ICD-10-CM

## 2022-10-20 DIAGNOSIS — N95.2 VAGINAL ATROPHY: ICD-10-CM

## 2022-10-20 DIAGNOSIS — N39.46 URINARY INCONTINENCE, MIXED: ICD-10-CM

## 2022-10-20 DIAGNOSIS — L29.2 VULVAR ITCHING: Primary | ICD-10-CM

## 2022-10-20 PROCEDURE — 99999 PR PBB SHADOW E&M-EST. PATIENT-LVL V: CPT | Mod: PBBFAC,,, | Performed by: OBSTETRICS & GYNECOLOGY

## 2022-10-20 PROCEDURE — 99215 OFFICE O/P EST HI 40 MIN: CPT | Mod: PBBFAC | Performed by: OBSTETRICS & GYNECOLOGY

## 2022-10-20 PROCEDURE — 88175 CYTOPATH C/V AUTO FLUID REDO: CPT | Performed by: PATHOLOGY

## 2022-10-20 PROCEDURE — 99204 OFFICE O/P NEW MOD 45 MIN: CPT | Mod: S$PBB,,, | Performed by: OBSTETRICS & GYNECOLOGY

## 2022-10-20 PROCEDURE — 88141 PR  CYTOPATH CERV/VAG INTERPRET: ICD-10-PCS | Mod: ,,, | Performed by: PATHOLOGY

## 2022-10-20 PROCEDURE — 88141 CYTOPATH C/V INTERPRET: CPT | Mod: ,,, | Performed by: PATHOLOGY

## 2022-10-20 PROCEDURE — 99204 PR OFFICE/OUTPT VISIT, NEW, LEVL IV, 45-59 MIN: ICD-10-PCS | Mod: S$PBB,,, | Performed by: OBSTETRICS & GYNECOLOGY

## 2022-10-20 PROCEDURE — 81514 NFCT DS BV&VAGINITIS DNA ALG: CPT | Performed by: OBSTETRICS & GYNECOLOGY

## 2022-10-20 PROCEDURE — 99999 PR PBB SHADOW E&M-EST. PATIENT-LVL V: ICD-10-PCS | Mod: PBBFAC,,, | Performed by: OBSTETRICS & GYNECOLOGY

## 2022-10-20 PROCEDURE — 87086 URINE CULTURE/COLONY COUNT: CPT | Performed by: OBSTETRICS & GYNECOLOGY

## 2022-10-20 PROCEDURE — 87624 HPV HI-RISK TYP POOLED RSLT: CPT | Performed by: OBSTETRICS & GYNECOLOGY

## 2022-10-20 RX ORDER — TACROLIMUS 1 MG/G
OINTMENT TOPICAL
COMMUNITY
Start: 2022-06-08 | End: 2023-05-26

## 2022-10-20 RX ORDER — FLASH GLUCOSE SENSOR
1 KIT MISCELLANEOUS
COMMUNITY
Start: 2022-05-25 | End: 2023-05-26

## 2022-10-20 RX ORDER — INSULIN GLARGINE 100 [IU]/ML
20 INJECTION, SOLUTION SUBCUTANEOUS NIGHTLY
COMMUNITY
Start: 2022-08-11 | End: 2022-11-16

## 2022-10-20 RX ORDER — HYDROXYZINE PAMOATE 25 MG/1
25 CAPSULE ORAL NIGHTLY
COMMUNITY
Start: 2022-08-10 | End: 2022-11-16

## 2022-10-20 RX ORDER — HYDRALAZINE HYDROCHLORIDE 25 MG/1
25 TABLET, FILM COATED ORAL 3 TIMES DAILY
COMMUNITY
Start: 2022-06-30 | End: 2022-11-14

## 2022-10-20 RX ORDER — FLASH GLUCOSE SCANNING READER
1 EACH MISCELLANEOUS
COMMUNITY
Start: 2022-05-25 | End: 2023-05-26

## 2022-10-20 NOTE — PROGRESS NOTES
[facility] - UROGYNECOLOGY  [address] [address]    Sandi Dobson  [id number]  1955 October 24, 2022    Consulting Physician: Self, Aaareferral   GYN: none  Primary M.D.: Alfonso Baxter MD    Chief Complaint   Patient presents with    Urinary Incontinence       HPI:     1)  UI:  (+) RUDY (bend, cough, sneeze) = (+) UUI  X years, worsening. Taking mirabegron 25 mg -- unsure if helps. Tried PFPT ([facility]) in 2020.    (--) pads--should be. Sleeps in lazy boy recliner, has a dog pad underneath her--has a few drops of urine. Also has pad in wheelchair. Leaks with transferring.  Daytime frequency: Q 2 hours. Tries to hold at least that.   Nocturia: Yes: 1/night.   (--) dysuria,  (--) hematuria,  (+) reported frequent UTIs. Sees PCP--sends in ABX/pyridium without testing. Not sure C&S are sent.  (--) complete bladder emptying. +PV urgency/hesitancy. DV with small 2nd volume.     --9/2022 [facility] [place]  presents for follow up with acute vulvitis, stress urinary incontinence and overactive bladder.. Patient is being currently treated for chronic and acute vulvitis and vulvar pruritus. She has been using RO soaks and topical steroids. She noticed only mild improvement and continues to have itching and scratching.    And also presents for follow-up of her urodynamics performed on August 22nd. The study revealed a normally sensate bladder of normal capacity and compliance. She did exhibit stress urinary incontinence at a fill of 392 cc with Valsalva leak point pressures of 27 and 34. She voids by detrusor contraction to completion with a postvoid residual of 60 cc. The bladder was stable throughout there was no evidence of DO or DI.     1. Overactive bladder  Reviewed urodynamics study with the patient which failed to reveal detrusor overactivity or detrusor overactivity incontinence.    2. RUDY (stress urinary incontinence, female)  Reviewed urodynamic study with the patient which did reveal  stress urinary incontinence at low urethral pressures consistent with possible intrinsic sphincter deficiency. We discussed treatment options including possible mid urethral sling with bulking or staged approach with bulking later. Reviewed the nature and technique of the procedure, recovery, and potential risks and complications. Recommend a staged approach to avoid urinary retention. Patient is given literature on mid urethral sling and on bulking procedure.    3. Vulvar pruritus  Patient will continue to use the birth soaks in the topical hydrocortisone as well as her Vaseline mildly it barrier an effort to remove the condition of the skin of the vulva perianal area and upper thighs. We did discuss with her avoiding scratching the areas I will only incur further itching. She is also using some cold packs to help reduce the sensation. She is return to the office in 1 month to recheck vulva and we consider her treatment options for stress urinary incontinence.    2)  POP:  Absent.  (--) vaginal bleeding. (--) vaginal discharge. (--) sexually active.  (--) dyspareunia.  (+)  Vaginal dryness.  (--) vaginal estrogen use.     3)  BM:  (--) constipation/straining.  (--) chronic diarrhea. (--) hematochezia.  (--) fecal incontinence.  (--) fecal smearing/urgency.  (+) complete evacuation.     4)  Vulvar irritation:  --using hydrocortisone cream daily  --scratches a lot  --uses ice packs  --using domeboro (aluminum and calcium) solution to soak  --has tried vaseline and EVVO  --thinks this is from staying wet with urine      Past Medical History  Past Medical History:   Diagnosis Date    History of colonic polyps 01/29/2021 11/19 [facility] - Findings: 2 sessile polyps hepatic flexure and rectosigmoid      Hypertension     Mood disorder 01/29/2021    Uncontrolled type 2 diabetes mellitus with hyperglycemia 01/29/2021   --B knee OA: uses wheelchair; has trouble straightening knees; is not able to walk much at all    --mood  disorder: h/o situational depression (mother was killed by drunk )--resolved; followed by psych (Radha); takes effexor XR; currently stable    --DM2:  most recent reported as 13%+ (could not have cataract surgery 2022); no secondary disease; ? Mild B foot neuropathy  Lab Results   Component Value Date    HGBA1C 6.3 (H) 2021    --has really controlled diet   --metformin, glipizide, pioglitazone, Lantus (not taking this and PCP not aware)       Past Surgical History  History reviewed. No pertinent surgical history.   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  C/s x 0.    Largest infant weight: 7#15oz.   no FAVD. yes episiotomy--4th deg.      Gynecologic History  LMP: No LMP recorded (lmp unknown). Patient is postmenopausal.  Age of menarche: 10 yo  Age of menopause: 48 yo  Menstrual history: h/o normal  Pap test: unknown.  History of abnormal paps: No.  History of STIs:  No  Mammogram: needs to schedule  Colonoscopy: Date of last: .  Result:  Findings: 2 sessile polyps hepatic flexure and rectosigmoid.  Repeat due:  .    DEXA:  needs to schedule    Family History  Family History   Problem Relation Age of Onset    Vision loss Mother     Diabetes Father     Cancer Father     Hearing loss Maternal Aunt     Hearing loss Maternal Grandmother     Stroke Paternal Grandmother       Colon CA: No  Breast CA: No  GYN CA: No   CA: No    Social History  Social History     Tobacco Use   Smoking Status Never   Smokeless Tobacco Never     Social History     Substance and Sexual Activity   Alcohol Use None   .    Social History     Substance and Sexual Activity   Drug Use Not Currently     The patient is single.  Resides in [place] [address].  Employment status: retired [employer]; now works for "[employer]     Allergies  Review of patient's  allergies indicates:  No Known Allergies    Medications  Current Outpatient Medications on File Prior to Visit   Medication Sig Dispense Refill    amLODIPine (NORVASC) 5 MG tablet Take 1 tablet (5 mg total) by mouth daily as needed. 90 tablet 90    APPLE CIDER VINEGAR ORAL Take by mouth.      calcium acetate-aluminum sulf 952-1,347 mg 952-1,347 mg PwPk Dissolve 1 packet in 16 oz of water, use solution as a wet dressing 30 minutes 4 times a day      ferrous fumarate-vitamin C 200 mg (65 mg iron)-25 mg TbSR Take by mouth.      flash glucose scanning reader (Phoenix TechnologiesSTYLE NINA 14 DAY READER) Misc 1 each by Other route.      flash glucose sensor (FREESTYLE NINA 14 DAY SENSOR) Kit 1 kit by Other route.      glipiZIDE (GLUCOTROL) 10 MG tablet Take 1 tablet (10 mg total) by mouth once daily. 90 tablet 12    hydrOXYzine pamoate (VISTARIL) 25 MG Cap Take 25 mg by mouth every evening.      metFORMIN (GLUCOPHAGE) 500 MG tablet 2 pills BID2 pills  tablet 12    omeprazole (PRILOSEC) 10 MG capsule Take 2 capsules (20 mg total) by mouth 2 (two) times daily before meals. 120 capsule 11    pioglitazone (ACTOS) 30 MG tablet Take 1 tablet (30 mg total) by mouth once daily. 90 tablet 0    tacrolimus (PROTOPIC) 0.1 % ointment APPLY TOPICALLY TO THE AFFECTED AREA DAILY      venlafaxine (EFFEXOR-XR) 150 MG Cp24 Take  3 at night 270 capsule 12    hydrALAZINE (APRESOLINE) 25 MG tablet Take 25 mg by mouth 3 (three) times daily.      LANTUS U-100 INSULIN 100 unit/mL injection Inject 20 Units into the skin every evening.       No current facility-administered medications on file prior to visit.       Review of Systems A 14 point ROS was reviewed with pertinent positives as noted above in the history of present illness.      Constitutional: negative  Eyes: negative  Endocrine: negative  Gastrointestinal: negative  Cardiovascular: negative  Respiratory: negative  Allergic/Immunologic: negative  Integumentary: negative  Psychiatric:  negative  Musculoskeletal: negative   Ear/Nose/Throat: negative  Neurologic: negative  Genitourinary: SEE HPI  Hematologic/Lymphatic: negative   Breast: negative    Urogynecologic Exam  /66 (BP Location: Right arm, Patient Position: Sitting, BP Method: Large (Manual))   Ht 6' (1.829 m)   Wt 127 kg (280 lb)   LMP  (LMP Unknown)   BMI 37.97 kg/m²     GENERAL APPEARANCE:  The patient is well-developed, well-nourished.  Neck:  Supple with no thyromegaly, no carotid bruits.  Heart:  Regular rate and rhythm, no murmurs, rubs or gallops.  Lungs:  Clear.  No CVA tenderness.  Abdomen:  Soft, nontender, nondistended, no hepatosplenomegaly. Inhibited by habitus.   Incisions:  Pfannenstiel well-healed    PELVIC:    External genitalia:  Normal Bartholins, Skenes and labia bilaterally.  +mild excoriation of labia majora and B groin, concerning for candida or atrophy.   Urethra:  No caruncle, diverticulum or masses.  (+) hypermobility.    Vagina:  Atrophy (+) , no bladder masses or tender, no discharge.  Affirm collected.   Cervix:  normal appearance; + stenosis--pap collected by internal may be insufficient due to stenosis  Uterus: exam inhibited by habitus  Adnexa: Not palpable.    POP-Q:  Aa -1; Ba -1; C -5; Ap -1; Bp -; D -6.  Genital hiatus 4, perineal body 2, total vaginal length 10-11.    NEUROLOGIC:  Cranial nerves 2 through 12 intact.  Strength 5/5.  DTRs 2+ lower extremities.  S2 through 4 normal.  Sacral reflexes intact.    EXT: DUNBAR, 2+ pulses bilaterally, no C/C/E    COUGH STRESS TEST:  negative  KEGEL: 1 /5    RECTAL:    External:  Normal, (--) hemorrhoids, (--) dovetailing.   Internal:   (--) tenderness, (--) masses, Normal resting tone, Normal active tone.    PVR: 10 mL    Impression    1. Vulvar itching    2. Cervical cancer screening    3. Urinary incontinence, mixed    4. Vaginal atrophy    5. Vulvar irritation        Initial Plan  The patient was counseled regarding these issues. The patient was given  "a summary sheet containing each of these issues with possible options for evaluation and management. When appropriate, we also reviewed computer-generated diagrams specific to their diagnoses..  All questions were addressed to the patient's satisfaction.    1)  Vulvar irritation:  --affirm to check for yeast, silvia with high A1c   --if yeast, will start nystatin cream +/- diflucan series, then nystatin powder   --if not yeast, then start vaginal estrogen + drying powder  --control urinary leakage/moisture in area  --control diabetes  --see dermatology as scheduled    2)  Mixed urinary incontinence, urge < stress:    --urine C&S  --control diabetes  --work on weight losst  --Empty bladder every 2-3 hours.  Empty well: wait a minute, lean forward on toilet.    --Avoid dietary irritants (see sheet).  Keep diary x 3-5 days to determine your irritants.  --start pelvic floor PT. Call to make appt:  [facility] (all take Medicaid):  FAUSTINO Tolentino/Paradise Sanchez: (p) [phone]  . (f) [phone].    --URGE: increase mirabegron to 50 mg daily--make sure they're filling generic. Takes 2-4 weeks to see if will have effect.  For dry mouth: get sour, sugar free lozenge or gum.    --STRESS:  Pessary vs. Sling.    --RTC for pessary fitting   --need recheck for anterior prolapse before any surgery   --request outside UDS    3)  well-woman:  Pap test: +HPV done today; may be insufficient due to stenosis  Mammogram: needs to schedule  Colonoscopy: Date of last: 2019.  Result:  Findings: 2 sessile polyps hepatic flexure and rectosigmoid.  Repeat due:  2029.    DEXA:  needs to schedule    4) RTC for pessary fitting with PA or NP.     Addendum:  --affirm NEG  --will advise: STOP steroid. Start estrogen cream: apply dime-sized amount with fingers inside vagina (as far as possible), then thin layer around opening/inner lips/external parts/groin (all irritated parts) every night before bed.  Sleep without panties on pad to let "air out."  Then, " use absorbant, talc-free powder, like gold bond or zeasorb, around area during day to decrease moisture (can also use under pannus).  Make sure to gently wash off powder before next application. Estrogen [facility] to pharmacy: [facility].  If itches in between, an apply thin layer of coconut oil (can get at grocery store). Would avoid vaseline/petroleum products, as they can be drying.     Approximately 45 min were spent in consult, 90 % in discussion.     Thank you for requesting consultation of your patient.  I look forward to participating in their care.    Ebonie Vasquez  Female Pelvic Medicine and Reconstructive Surgery  [facility] [place], [place]

## 2022-10-20 NOTE — PATIENT INSTRUCTIONS
Bladder Irritants  Certain foods and drinks have been associated with worsening symptoms of urinary frequency, urgency, urge incontinence, or bladder pain. If you suffer from any of these conditions, you may wish to try eliminating one or more of these foods from your diet and see if your symptoms improve. If bladder symptoms are related to dietary factors, strict adherence to a diet thateliminates the food should bring marked relief in 10 days. Once you are feeling better, you can begin to add foods back into your diet, one at a time. If symptoms return, you will be able to identify the irritant. As you add foods back to your diet it is very important that you drink significant amounts of water.    -----------------------------------------------------------------------------------------------  List of Common Bladder Irritants*  Alcoholic beverages  Apples and apple juice  Cantaloupe  Carbonated beverages  Chili and spicy foods  Chocolate  Citrus fruit  Coffee (including decaffeinated)  Cranberries and cranberry juice  Grapes  Guava  Milk Products: milk, cheese, cottage cheese, yogurt, ice cream  Peaches  Pineapple  Plums  Strawberries  Sugar especially artificial sweeteners, saccharin, aspartame, corn sweeteners, honey, fructose, sucrose, lactose  Tea  Tomatoes and tomato juice  Vitamin B complex  Vinegar  *Most people are not sensitive to ALL of these products; your goal is to find the foods that make YOUR symptoms worse.  ---------------------------------------------------------------------------------------------------    Low-acid fruit substitutions include apricots, papaya, pears and watermelon. Coffee drinkers can drink Kava or other lowacid instant drinks. Tea drinkers can substitute non-citrus herbal and sun brewed teas. Calcium carbonate co-buffered with calcium ascorbate can be substituted for Vitamin C. Prelief is a dietary supplement that works as an acid blocker for the bladder.    Where to get more  information:        Overcoming Bladder Disorders by Veronica Duran and Milagro Curry, 1990        You Dont Have to Live with Cystitis! By Linda Magallon, 1988  http://[email or web address].org/oab  -------------------------------------------------------------------------    1)  Vulvar irritation:  --affirm to check for yeast, silvia with high A1c   --if yeast, will start nystatin cream +/- diflucan series, then nystatin powder   --if not yeast, then start vaginal estrogen + drying powder  --control urinary leakage/moisture in area  --control diabetes  --see dermatology as scheduled    2)  Mixed urinary incontinence, urge < stress:    --urine C&S  --control diabetes  --work on weight losst  --Empty bladder every 2-3 hours.  Empty well: wait a minute, lean forward on toilet.    --Avoid dietary irritants (see sheet).  Keep diary x 3-5 days to determine your irritants.  --start pelvic floor PT. Call to make appt:  [facility] (all take Medicaid):  FAUSTINO Tolentino/Paradise Sanchez: (p) [phone]  . (f) [phone].    --URGE: increase mirabegron to 50 mg daily--make sure they're filling generic. Takes 2-4 weeks to see if will have effect.  For dry mouth: get sour, sugar free lozenge or gum.    --STRESS:  Pessary vs. Sling.    --RTC for pessary fitting   --need recheck for anterior prolapse before any surgery   --request outside UDS    3)  well-woman:  Pap test: +HPV done today; may be insufficient due to stenosis  Mammogram: needs to schedule  Colonoscopy: Date of last: 2019.  Result:  Findings: 2 sessile polyps hepatic flexure and rectosigmoid.  Repeat due:  2029.    DEXA:  needs to schedule    4) RTC for pessary fitting with PA or NP.

## 2022-10-21 ENCOUNTER — TELEPHONE (OUTPATIENT)
Dept: UROGYNECOLOGY | Facility: CLINIC | Age: 67
End: 2022-10-21
Payer: MEDICARE

## 2022-10-21 LAB
BACTERIAL VAGINOSIS DNA: NEGATIVE
CANDIDA GLABRATA DNA: NEGATIVE
CANDIDA KRUSEI DNA: NEGATIVE
CANDIDA RRNA VAG QL PROBE: NEGATIVE
T VAGINALIS RRNA GENITAL QL PROBE: NEGATIVE

## 2022-10-21 RX ORDER — ESTRADIOL 0.1 MG/G
CREAM VAGINAL
Qty: 42.5 G | Refills: 11 | Status: SHIPPED | OUTPATIENT
Start: 2022-10-21 | End: 2023-10-26

## 2022-10-21 NOTE — TELEPHONE ENCOUNTER
Called patient regarding negative vaginal swab. No answer, unable to leave voicemail. Portal message sent with results and instructions per Dr. Vasquez.

## 2022-10-21 NOTE — TELEPHONE ENCOUNTER
"----- Message from Ebonie Vasquze MD sent at 10/21/2022  1:11 PM CDT -----  Please let patient know:  --affirm NEG for infection, including yeast  --watch blood glucose control  --will advise: STOP steroid ointment/cream. Start estrogen cream: apply dime-sized amount with fingers inside vagina (as far as possible), then thin layer around opening/inner lips/external parts/groin (all irritated parts) every night before bed.  Sleep without panties on pad to let "air out."  Then, use absorbant, talc-free powder, like gold bond or zeasorb, around area during day to decrease moisture (can also use under pannus).  Make sure to gently wash off powder before next application. Estrogen cream sent to pharmacy: [facility].  If itches in between, an apply thin layer of coconut oil (can get at grocery store). Would avoid vaseline/petroleum products, as they can be drying.     "

## 2022-10-22 LAB — BACTERIA UR CULT: NO GROWTH

## 2022-10-24 PROBLEM — N39.46 URINARY INCONTINENCE, MIXED: Status: ACTIVE | Noted: 2022-10-24

## 2022-10-24 PROBLEM — N95.2 VAGINAL ATROPHY: Status: ACTIVE | Noted: 2022-10-24

## 2022-10-24 PROBLEM — N90.89 VULVAR IRRITATION: Status: ACTIVE | Noted: 2022-10-24

## 2022-10-25 LAB
COMMENT: NORMAL
FINAL PATHOLOGIC DIAGNOSIS: NORMAL
Lab: NORMAL

## 2022-10-26 DIAGNOSIS — E11.9 TYPE 2 DIABETES MELLITUS WITHOUT COMPLICATION, UNSPECIFIED WHETHER LONG TERM INSULIN USE: ICD-10-CM

## 2022-10-26 DIAGNOSIS — E11.9 TYPE 2 DIABETES MELLITUS WITHOUT COMPLICATION, WITHOUT LONG-TERM CURRENT USE OF INSULIN: ICD-10-CM

## 2022-10-26 DIAGNOSIS — E11.9 TYPE 2 DIABETES MELLITUS WITHOUT COMPLICATION: ICD-10-CM

## 2022-10-27 ENCOUNTER — TELEPHONE (OUTPATIENT)
Dept: UROGYNECOLOGY | Facility: CLINIC | Age: 67
End: 2022-10-27
Payer: MEDICARE

## 2022-10-27 LAB
HPV HR 12 DNA SPEC QL NAA+PROBE: POSITIVE
HPV16 AG SPEC QL: POSITIVE
HPV18 DNA SPEC QL NAA+PROBE: NEGATIVE

## 2022-10-27 NOTE — TELEPHONE ENCOUNTER
Called patient regarding negative pap smear. No answer, unable to leave voicemail. Will follow up with portal message.

## 2022-10-27 NOTE — TELEPHONE ENCOUNTER
----- Message from Ebonie Vasquez MD sent at 10/26/2022  6:52 PM CDT -----  Please let patient know pap smear was normal. We will call her with HPV results when back, Thanks!

## 2022-10-31 ENCOUNTER — PATIENT MESSAGE (OUTPATIENT)
Dept: ADMINISTRATIVE | Facility: HOSPITAL | Age: 67
End: 2022-10-31
Payer: MEDICARE

## 2022-10-31 DIAGNOSIS — B97.7 HPV IN FEMALE: Primary | ICD-10-CM

## 2022-11-03 ENCOUNTER — TELEPHONE (OUTPATIENT)
Dept: FAMILY MEDICINE | Facility: CLINIC | Age: 67
End: 2022-11-03
Payer: MEDICARE

## 2022-11-03 DIAGNOSIS — Z00.00 ROUTINE GENERAL MEDICAL EXAMINATION AT A HEALTH CARE FACILITY: ICD-10-CM

## 2022-11-03 DIAGNOSIS — E11.9 TYPE 2 DIABETES MELLITUS WITHOUT COMPLICATION, WITHOUT LONG-TERM CURRENT USE OF INSULIN: Primary | ICD-10-CM

## 2022-11-03 DIAGNOSIS — I10 ESSENTIAL HYPERTENSION: ICD-10-CM

## 2022-11-03 DIAGNOSIS — F39 MOOD DISORDER: ICD-10-CM

## 2022-11-03 DIAGNOSIS — E11.65 UNCONTROLLED TYPE 2 DIABETES MELLITUS WITH HYPERGLYCEMIA: ICD-10-CM

## 2022-11-03 NOTE — TELEPHONE ENCOUNTER
----- Message from Klaus Munguia sent at 11/3/2022 10:11 AM CDT -----  Type: Patient Call Back    Who called:self    What is the request in detail:Pt would like an order for A1C and lipid panel.     Can the clinic reply by [facility]?    Would the patient rather a call back or a response via [facility]? call    Best call back number:[phone] (home)

## 2022-11-03 NOTE — TELEPHONE ENCOUNTER
Patient has appointment with PCP 11/14 and requesting labs or A1C and lipid. Patient already has CMP orderd.  Please advise

## 2022-11-10 ENCOUNTER — HOSPITAL ENCOUNTER (OUTPATIENT)
Dept: RADIOLOGY | Facility: CLINIC | Age: 67
Discharge: HOME | End: 2022-11-10
Attending: NURSE PRACTITIONER
Payer: MEDICARE

## 2022-11-10 DIAGNOSIS — Z78.0 POSTMENOPAUSAL STATE: ICD-10-CM

## 2022-11-10 DIAGNOSIS — Z00.00 ENCOUNTER FOR PREVENTIVE HEALTH EXAMINATION: ICD-10-CM

## 2022-11-10 PROCEDURE — 77080 DXA BONE DENSITY AXIAL: CPT | Mod: TC,PO

## 2022-11-10 PROCEDURE — 77080 DEXA BONE DENSITY SPINE HIP: ICD-10-PCS | Mod: 26,,, | Performed by: INTERNAL MEDICINE

## 2022-11-10 PROCEDURE — 77080 DXA BONE DENSITY AXIAL: CPT | Mod: 26,,, | Performed by: INTERNAL MEDICINE

## 2022-11-14 ENCOUNTER — OFFICE VISIT (OUTPATIENT)
Dept: FAMILY MEDICINE | Facility: CLINIC | Age: 67
End: 2022-11-14
Payer: MEDICARE

## 2022-11-14 VITALS
HEIGHT: 72 IN | OXYGEN SATURATION: 95 % | BODY MASS INDEX: 37.97 KG/M2 | HEART RATE: 85 BPM | TEMPERATURE: 98 F | SYSTOLIC BLOOD PRESSURE: 124 MMHG | DIASTOLIC BLOOD PRESSURE: 64 MMHG

## 2022-11-14 DIAGNOSIS — E11.65 UNCONTROLLED TYPE 2 DIABETES MELLITUS WITH HYPERGLYCEMIA: ICD-10-CM

## 2022-11-14 DIAGNOSIS — E66.01 SEVERE OBESITY (BMI 35.0-39.9) WITH COMORBIDITY: ICD-10-CM

## 2022-11-14 DIAGNOSIS — Z86.010 HISTORY OF COLONIC POLYPS: ICD-10-CM

## 2022-11-14 DIAGNOSIS — Z00.00 ROUTINE MEDICAL EXAM: Primary | ICD-10-CM

## 2022-11-14 DIAGNOSIS — R79.89 ABNORMAL LIVER FUNCTION TESTS: ICD-10-CM

## 2022-11-14 DIAGNOSIS — E11.9 TYPE 2 DIABETES MELLITUS WITHOUT COMPLICATION, WITHOUT LONG-TERM CURRENT USE OF INSULIN: ICD-10-CM

## 2022-11-14 DIAGNOSIS — I10 ESSENTIAL HYPERTENSION: ICD-10-CM

## 2022-11-14 DIAGNOSIS — F39 MOOD DISORDER: ICD-10-CM

## 2022-11-14 DIAGNOSIS — N32.81 OVERACTIVE BLADDER: ICD-10-CM

## 2022-11-14 DIAGNOSIS — Z12.31 ENCOUNTER FOR SCREENING MAMMOGRAM FOR BREAST CANCER: ICD-10-CM

## 2022-11-14 PROCEDURE — 99213 OFFICE O/P EST LOW 20 MIN: CPT | Mod: PBBFAC,PO | Performed by: INTERNAL MEDICINE

## 2022-11-14 PROCEDURE — 99999 PR PBB SHADOW E&M-EST. PATIENT-LVL III: CPT | Mod: PBBFAC,,, | Performed by: INTERNAL MEDICINE

## 2022-11-14 PROCEDURE — 99397 PR PREVENTIVE VISIT,EST,65 & OVER: ICD-10-PCS | Mod: S$PBB,GZ,, | Performed by: INTERNAL MEDICINE

## 2022-11-14 PROCEDURE — 99397 PER PM REEVAL EST PAT 65+ YR: CPT | Mod: S$PBB,GZ,, | Performed by: INTERNAL MEDICINE

## 2022-11-14 PROCEDURE — 99999 PR PBB SHADOW E&M-EST. PATIENT-LVL III: ICD-10-PCS | Mod: PBBFAC,,, | Performed by: INTERNAL MEDICINE

## 2022-11-14 RX ORDER — GLIPIZIDE 10 MG/1
10 TABLET ORAL 2 TIMES DAILY WITH MEALS
Qty: 180 TABLET | Refills: 12 | Status: SHIPPED | OUTPATIENT
Start: 2022-11-14 | End: 2022-11-14 | Stop reason: SDUPTHER

## 2022-11-14 RX ORDER — PIOGLITAZONEHYDROCHLORIDE 45 MG/1
45 TABLET ORAL DAILY
Qty: 90 TABLET | Refills: 4
Start: 2022-11-14

## 2022-11-14 RX ORDER — GLIPIZIDE 10 MG/1
10 TABLET ORAL 2 TIMES DAILY WITH MEALS
Qty: 180 TABLET | Refills: 12 | Status: SHIPPED | OUTPATIENT
Start: 2022-11-14

## 2022-11-14 NOTE — PROGRESS NOTES
Chief complaint:  Physical exam, checking in 16 minutes late,     new patient and last seen 1/2021    67-year-old white female with multiple medical problems.  She was seeing a PCP Dr. Shilpa Neal at [facility] but that particular vision was only in clinic one day per week and she would like better access.  She filled out her information online but we did have to spend excessive amount of times trying to update her medications clarify them and then transfer them to our system which then needed to be corrected a 2nd time.  She is to see Dr. Garcia who apparently had her on high dose of Effexor and she has been good with that.  Her A1c in June 2020 was 6.1 then  I see a 13 and then 9.1.  I reviewed outside records.  She does continue on three medications for diabetes.  She has her mammogram set.  She has a history of colon polyps that I was able to find some access to and she is up-to-date.      Apparently still [facility]  She was offered insulin that scared her so she improved her diet her A1cs improving.  She is on glipizide 10 daily we discussed going to twice daily watching for hypoglycemia.  She already was on an increased dose of Actos and I made that adjustment to her med list.  We discussed getting repeat labs before the end of the year as it will probably be continuing to improve.    She had some itching and apparently maybe hydralazine was actually given to her instead of hydroxyzine but she is not taking the hydralazine and she found that the hydroxyzine did not help too much with the itching but I advised her and reassured it is okay for her to continue if it helps somewhat.  She does have some dry skin.    ALT up this time- diabetes?    Mammo 2019, looked up the report and looks like [facility] so I gave her a printed copy and a printed prescription to follow through on getting that done outside the system.    BMD done -report pending      Colon 11/19 2  polyps -This is a 64 y.o. woman with PMH significant for NIDDM, HTN, OA, depression and anxiety, referred to [facility] for evaluation for colonoscopy. Says her last colonoscopy was about  and was normal per the patient      ROS:   CONST: weight stable. EYES: no vision change. ENT: no sore throat. CV: no chest pain w/ exertion. RESP: no shortness of breath. GI: no nausea, vomiting, diarrhea. No dysphagia. : no urinary issues. MUSCULOSKELETAL: no new myalgias or arthralgias. SKIN: no new changes. NEURO: no focal deficits. PSYCH: no new issues. ENDOCRINE: no polyuria. HEME: no lymph nodes. ALLERGY: no general pruritis.    Past medical history:  1.  Diabetes  2.  Depression, seen in the past by Dr. Garcia  3.  Colon polyps 2019 -5 yrs?  4.  Osteoarthritis, cortisone injections in both shoulders  5.  Hypertension    Past surgical history:  1.  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  Right Achilles tendon rupture and repair     Family history:  Father  at 82 of prostate cancer and diabetes.  Mother  at 60 from a drunk .  Brother  at 66 with diabetes.  1 brother living.    Social history:  Retired working for the [employer].   for six years with no previous marriage.  She has two children.  She does not smoke or drink.    Gen: no distress  EYES: conjunctiva clear, non-icteric, PERRL  ENT: nose clear, nasal mucosa normal, oropharynx clear and moist, teeth good  NECK:supple, thyroid non-palpable  RESP: effort is good, lungs clear  CV: heart RRR w/o murmur, gallops or rubs; no carotid bruits, trace edema  GI: abdomen soft, non-distended, non-tender, no hepatosplenomegaly  MS:  In wheelchair no clubbing or cyanosis of the digits  SKIN: no rashes, warm to touch      Available colonoscopy reports, labs, x-ray reports reviewed.  All available records reviewed as was med list.      Sandi was seen today for annual exam.    Diagnoses and all  orders for this visit:    Routine medical exam, mammo prescription given to patient., colon 2024    Uncontrolled type 2 diabetes mellitus with hyperglycemia, glipizide to BID, labs late Dec  -     Hemoglobin A1C; Future  -     Comprehensive Metabolic Panel; Future    Type 2 diabetes mellitus without complication, without long-term current use of insulin    Essential hypertension ,chronic condition and stable.     Mood disorder ,chronic condition and stable.     History of colonic polyps    Encounter for screening mammogram for breast cancer  -     Mammo Digital Screening Bilat; Future    Overactive bladder    Severe obesity (BMI 35.0-39.9) with comorbidity    Abnormal liver function tests, DM?  -     Comprehensive Metabolic Panel; Future    Other orders  -     pioglitazone (ACTOS) 45 MG tablet; Take 1 tablet (45 mg total) by mouth once daily.  -     glipiZIDE (GLUCOTROL) 10 MG tablet; Take 1 tablet (10 mg total) by mouth 2 (two) times daily with meals.

## 2022-11-16 ENCOUNTER — OFFICE VISIT (OUTPATIENT)
Dept: UROGYNECOLOGY | Facility: CLINIC | Age: 67
End: 2022-11-16
Payer: MEDICARE

## 2022-11-16 VITALS — HEIGHT: 72 IN | DIASTOLIC BLOOD PRESSURE: 70 MMHG | BODY MASS INDEX: 37.97 KG/M2 | SYSTOLIC BLOOD PRESSURE: 140 MMHG

## 2022-11-16 DIAGNOSIS — N39.46 URINARY INCONTINENCE, MIXED: Primary | ICD-10-CM

## 2022-11-16 DIAGNOSIS — B97.7 HPV IN FEMALE: ICD-10-CM

## 2022-11-16 DIAGNOSIS — N95.2 VAGINAL ATROPHY: ICD-10-CM

## 2022-11-16 PROCEDURE — 57454 BX/CURETT OF CERVIX W/SCOPE: CPT | Mod: S$PBB,,, | Performed by: NURSE PRACTITIONER

## 2022-11-16 PROCEDURE — 57160 INSERT PESSARY/OTHER DEVICE: CPT | Mod: 51,S$PBB,, | Performed by: NURSE PRACTITIONER

## 2022-11-16 PROCEDURE — 99214 OFFICE O/P EST MOD 30 MIN: CPT | Mod: PBBFAC,25 | Performed by: NURSE PRACTITIONER

## 2022-11-16 PROCEDURE — 57454 COLPOSCOPY W/BIOPSY AND ECC- FUTURE: ICD-10-PCS | Mod: S$PBB,,, | Performed by: NURSE PRACTITIONER

## 2022-11-16 PROCEDURE — 99999 PR PBB SHADOW E&M-EST. PATIENT-LVL IV: CPT | Mod: PBBFAC,,, | Performed by: NURSE PRACTITIONER

## 2022-11-16 PROCEDURE — 99999 PR PBB SHADOW E&M-EST. PATIENT-LVL IV: ICD-10-PCS | Mod: PBBFAC,,, | Performed by: NURSE PRACTITIONER

## 2022-11-16 PROCEDURE — 57160 INSERT PESSARY/OTHER DEVICE: CPT | Mod: PBBFAC | Performed by: NURSE PRACTITIONER

## 2022-11-16 PROCEDURE — 57160 PR FIT/INSERT INTRAVAG SUPPORT DEVICE: ICD-10-PCS | Mod: 51,S$PBB,, | Performed by: NURSE PRACTITIONER

## 2022-11-16 PROCEDURE — 57454 BX/CURETT OF CERVIX W/SCOPE: CPT | Mod: PBBFAC | Performed by: NURSE PRACTITIONER

## 2022-11-16 RX ORDER — KETOCONAZOLE 20 MG/ML
SHAMPOO, SUSPENSION TOPICAL
COMMUNITY
Start: 2022-10-31

## 2022-11-16 RX ORDER — BETAMETHASONE VALERATE 1.2 MG/G
AEROSOL, FOAM TOPICAL
COMMUNITY
Start: 2022-10-31

## 2022-11-16 RX ORDER — COVID-19 ANTIGEN TEST
KIT MISCELLANEOUS
COMMUNITY
Start: 2022-10-16 | End: 2022-12-29

## 2022-11-16 RX ORDER — DESONIDE 0.5 MG/G
CREAM TOPICAL
COMMUNITY
Start: 2022-10-31

## 2022-11-16 RX ORDER — KETOCONAZOLE 20 MG/G
CREAM TOPICAL 2 TIMES DAILY PRN
COMMUNITY
Start: 2022-10-31

## 2022-11-16 RX ORDER — IVERMECTIN 10 MG/G
CREAM TOPICAL
COMMUNITY
Start: 2022-10-31 | End: 2023-05-26

## 2022-11-16 NOTE — PROGRESS NOTES
Urogyn follow up  11/16/2022  .  [facility]  [address] [address]    Sandi Dobson  [id number]  1955      Sandi Dobson is a 67 y.o. here for a urogyn follow up for pessary fitting and colposcopy.    Last HPI from 10/20/2022     1)  UI:  (+) RUDY (bend, cough, sneeze) = (+) UUI  X years, worsening. Taking mirabegron 25 mg -- unsure if helps. Tried PFPT ([facility]) in 2020.    (--) pads--should be. Sleeps in lazy boy recliner, has a dog pad underneath her--has a few drops of urine. Also has pad in wheelchair. Leaks with transferring.  Daytime frequency: Q 2 hours. Tries to hold at least that.   Nocturia: Yes: 1/night.   (--) dysuria,  (--) hematuria,  (+) reported frequent UTIs. Sees PCP--sends in ABX/pyridium without testing. Not sure C&S are sent.  (--) complete bladder emptying. +PV urgency/hesitancy. DV with small 2nd volume.      --9/2022 [facility] ([place]):  presents for follow up with acute vulvitis, stress urinary incontinence and overactive bladder.. Patient is being currently treated for chronic and acute vulvitis and vulvar pruritus. She has been using RO soaks and topical steroids. She noticed only mild improvement and continues to have itching and scratching.    And also presents for follow-up of her urodynamics performed on August 22nd. The study revealed a normally sensate bladder of normal capacity and compliance. She did exhibit stress urinary incontinence at a fill of 392 cc with Valsalva leak point pressures of 27 and 34. She voids by detrusor contraction to completion with a postvoid residual of 60 cc. The bladder was stable throughout there was no evidence of DO or DI.      1. Overactive bladder  Reviewed urodynamics study with the patient which failed to reveal detrusor overactivity or detrusor overactivity incontinence.    2. RUDY (stress urinary incontinence, female)  Reviewed urodynamic study with the patient which did reveal stress urinary incontinence  at low urethral pressures consistent with possible intrinsic sphincter deficiency. We discussed treatment options including possible mid urethral sling with bulking or staged approach with bulking later. Reviewed the nature and technique of the procedure, recovery, and potential risks and complications. Recommend a staged approach to avoid urinary retention. Patient is given literature on mid urethral sling and on bulking procedure.    3. Vulvar pruritus  Patient will continue to use the birth soaks in the topical hydrocortisone as well as her Vaseline mildly it barrier an effort to remove the condition of the skin of the vulva perianal area and upper thighs. We did discuss with her avoiding scratching the areas I will only incur further itching. She is also using some cold packs to help reduce the sensation. She is return to the office in 1 month to recheck vulva and we consider her treatment options for stress urinary incontinence.     2)  POP:  Absent.  (--) vaginal bleeding. (--) vaginal discharge. (--) sexually active.  (--) dyspareunia.  (+)  Vaginal dryness.  (--) vaginal estrogen use.      3)  BM:  (--) constipation/straining.  (--) chronic diarrhea. (--) hematochezia.  (--) fecal incontinence.  (--) fecal smearing/urgency.  (+) complete evacuation.      4)  Vulvar irritation:  --using hydrocortisone cream daily  --scratches a lot  --uses ice packs  --using domeboro (aluminum and calcium) solution to soak  --has tried vaseline and EVVO  --thinks this is from staying wet with urine       Changes from last visit:  1) vulvar irritation has improved.    2)has only used vaginal estrogen cream once.     Past Medical History:   Diagnosis Date    History of colonic polyps 01/29/2021 11/19 [facility] - Findings: 2 sessile polyps hepatic flexure and rectosigmoid      Hypertension     Mood disorder 01/29/2021    Uncontrolled type 2 diabetes mellitus with hyperglycemia 01/29/2021   --B knee OA: uses wheelchair; has trouble  straightening knees; is not able to walk much at all     --mood disorder: h/o situational depression (mother was killed by drunk )--resolved; followed by psych (Radha); takes effexor XR; currently stable     --DM2:  most recent reported as 13%+ (could not have cataract surgery 8/2022); no secondary disease; ? 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 tube (fallopian tube was wrapped around)       Family History   Problem Relation Age of Onset    Vision loss Mother     Diabetes Father     Cancer Father     Hearing loss Maternal Aunt     Hearing loss Maternal Grandmother     Stroke Paternal Grandmother        Social History     Socioeconomic History    Marital status:    Tobacco Use    Smoking status: Never    Smokeless tobacco: Never   Substance and Sexual Activity    Drug use: Not Currently       Current Outpatient Medications   Medication Sig Dispense Refill    amLODIPine (NORVASC) 5 MG tablet Take 1 tablet (5 mg total) by mouth daily as needed. 90 tablet 90    betamethasone valerate (LUXIQ) 0.12 % foam Apply topically.      desonide (DESOWEN) 0.05 % cream SMARTSIG:Topical 3-4 Times Daily PRN      estradioL (ESTRACE) 0.01 % (0.1 mg/gram) vaginal cream dime-sized amount with finger in vagina/around external parts nightly. 42.5 g 11    ferrous fumarate-vitamin C 200 mg (65 mg iron)-25 mg TbSR Take by mouth.      flash glucose scanning reader (FREESTYLE NINA 14 DAY READER) Misc 1 each by Other route.      flash glucose sensor (FREESTYLE NINA 14 DAY SENSOR) Kit 1 kit by Other route.      FLOWFLEX COVID-19 AG HOME TEST Kit TEST AS DIRECTED TODAY      glipiZIDE (GLUCOTROL) 10 MG tablet  Take 1 tablet (10 mg total) by mouth 2 (two) times daily with meals. 180 tablet 12    ivermectin (SOOLANTRA) 1 % Crea Apply topically.      ketoconazole (NIZORAL) 2 % cream Apply topically 2 (two) times daily as needed.      ketoconazole (NIZORAL) 2 % shampoo Apply topically twice a week.      metFORMIN (GLUCOPHAGE) 500 MG tablet 2 pills BID2 pills  tablet 12    mirabegron (MYRBETRIQ) 50 mg Tb24 Take 1 tablet (50 mg total) by mouth once daily. 90 tablet 4    pioglitazone (ACTOS) 45 MG tablet Take 1 tablet (45 mg total) by mouth once daily. 90 tablet 4    tacrolimus (PROTOPIC) 0.1 % ointment APPLY TOPICALLY TO THE AFFECTED AREA DAILY      venlafaxine (EFFEXOR-XR) 150 MG Cp24 Take  3 at night 270 capsule 12    omeprazole (PRILOSEC) 10 MG capsule Take 2 capsules (20 mg total) by mouth 2 (two) times daily before meals. 120 capsule 11     No current facility-administered medications for this visit.       Review of patient's allergies indicates:  No Known Allergies    Well woman:  Pap test: 10/2022 neg, + HPV 16 and other high risk  History of abnormal paps: No.  History of STIs:  No  Mammogram: needs to schedule  Colonoscopy: Date of last: 2019.  Result:  Findings: 2 sessile polyps hepatic flexure and rectosigmoid.  Repeat due:  2029.    DEXA:  needs to schedule       ROS:  As per HPI.      Exam  BP (!) 140/70 (BP Location: Right arm, Patient Position: Sitting, BP Method: Large (Manual))   Ht 6' (1.829 m)   LMP  (LMP Unknown)   BMI 37.97 kg/m²   General: alert and oriented, no acute distress  Respiratory: normal respiratory effort  Abd: soft, non-tender, non-distended    Pelvic  Ext. Genitalia: normal external genitalia. Normal bartholin's and skeens glands  Vagina: ++ atrophy. Normal vaginal mucosa without lesions. No discharge noted.   Non-tender bladder base without palpable mass.  Cervix: no lesions and very irritated after inserting speculum and using acetic acid  Uterus:  uterus is normal size, shape,  consistency and nontender   Urethra: no masses or tenderness  Urethral meatus: no lesions, caruncle or prolapse.    The patient was fit with #3 ring with support pessary.  She was able to tolerate the device comfortabley with bending, squatting, valsalva.  She was not able to demonstrate independent removal and placement.  Pessary removed without difficulty.  Will order from [facility].  She tolerated the procedure well.     Impression  1. 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 Vaginal atrophy          We reviewed the above issues and discussed options for short-term versus long-term management of her problems.   Plan:   1)  Vulvar irritation:  --resolved     2)  Mixed urinary incontinence, urge < stress:    --control diabetes  --work on weight loss  --Empty bladder every 2-3 hours.  Empty well: wait a minute, lean forward on toilet.    --Avoid dietary irritants (see sheet).  Keep diary x 3-5 days to determine your irritants.  --start pelvic floor PT. Call to make appt:  [facility] (all take Medicaid):  FAUSTINO Tolentino/Paradise Sanchez: (p) [phone]  . (f) [phone].    --URGE: increase mirabegron to 50 mg daily--make sure they're filling generic. Takes 2-4 weeks to see if will have effect.  For dry mouth: get sour, sugar free lozenge or gum.    --STRESS:  Pessary vs. Sling.               --RTC for pessary fitting              --need recheck for anterior prolapse before any surgery              --request outside UDS     3)  well-woman:  Need to do colpo after using vaginal estrogen cream     4) RTC for pessary insertion/ colposcopy       I spent a total of 30 minutes on the day of the visit.  This includes face to face time and non-face to face time preparing to see the patient (eg, review of tests), obtaining and/or reviewing separately obtained history, documenting clinical information in the electronic or other health record, independently interpreting results  and communicating results to the patient/family/caregiver, or care coordinator.       Lizzeth Schmidt, FNP-BC  [facility]  Division of Female Pelvic Medicine and Reconstructive Surgery  Department of Obstetrics & Gynecology

## 2022-11-17 NOTE — PATIENT INSTRUCTIONS
1)  Vulvar irritation:  --resolved     2)  Mixed urinary incontinence, urge < stress:    --control diabetes  --work on weight loss  --Empty bladder every 2-3 hours.  Empty well: wait a minute, lean forward on toilet.    --Avoid dietary irritants (see sheet).  Keep diary x 3-5 days to determine your irritants.  --start pelvic floor PT. Call to make appt:  [facility] (all take Medicaid):  FAUSTINO Tolentino/Paradise Sanchez: (p) [phone]  . (f) [phone].    --URGE: increase mirabegron to 50 mg daily--make sure they're filling generic. Takes 2-4 weeks to see if will have effect.  For dry mouth: get sour, sugar free lozenge or gum.    --STRESS:  Pessary vs. Sling.               --RTC for pessary fitting              --need recheck for anterior prolapse before any surgery              --request outside UDS     3)  well-woman:  Need to do colpo after using vaginal estrogen cream     4) RTC for pessary insertion/ colposcopy

## 2022-11-17 NOTE — PROCEDURES
Colposcopy W/BIOPSY AND ECC- Future    Date/Time: 11/16/2022 2:00 PM  Performed by: Lizzeth Schmidt NP  Authorized by: Lizzeth Schmidt NP     Consent Done?:  Yes (Written)  Timeout:Immediately prior to procedure a time out was called to verify the correct patient, procedure, equipment, support staff and site/side marked as required  Prep:Patient was prepped and draped in the usual sterile fashion  Assistants?: Yes    List of assistants:  Lucy Holley MA    Colposcopy Site:  Cervix  Position:  Supine     Colpo aborted due to severe vaginal/ cervical atrophy

## 2022-11-18 ENCOUNTER — TELEPHONE (OUTPATIENT)
Dept: FAMILY MEDICINE | Facility: CLINIC | Age: 67
End: 2022-11-18
Payer: MEDICARE

## 2022-11-18 NOTE — TELEPHONE ENCOUNTER
----- Message from Letty Ashley sent at 11/18/2022 12:13 PM CST -----  Regarding: pt fax mammogram order / she's there now!!!!  Name of Who is Calling:[facility]  /Ranjit          What is the request in detail: Ranjit from [facility] is calling to get the pt's mammogram orders faxed over. The pt is there now. Please assist asap  FAX#   [phone]      Can the clinic reply by [facility]:no          What Number to Call Back if not in [facility]: [phone]

## 2022-11-21 ENCOUNTER — PATIENT MESSAGE (OUTPATIENT)
Dept: REHABILITATION | Facility: OTHER | Age: 67
End: 2022-11-21
Payer: MEDICARE

## 2022-11-23 ENCOUNTER — TELEPHONE (OUTPATIENT)
Dept: UROGYNECOLOGY | Facility: CLINIC | Age: 67
End: 2022-11-23
Payer: MEDICARE

## 2022-11-23 NOTE — TELEPHONE ENCOUNTER
Called patient regarding unpaid pessary order. Patient states she did not receive a payment link. Resent payment link to patient, pessary paid for, informed patient order should ship soon. Patient verbalized understanding, no questions at this time.

## 2022-11-28 ENCOUNTER — PATIENT OUTREACH (OUTPATIENT)
Dept: ADMINISTRATIVE | Facility: HOSPITAL | Age: 67
End: 2022-11-28
Payer: MEDICARE

## 2022-12-29 ENCOUNTER — OFFICE VISIT (OUTPATIENT)
Dept: UROGYNECOLOGY | Facility: CLINIC | Age: 67
End: 2022-12-29
Payer: MEDICARE

## 2022-12-29 VITALS — BODY MASS INDEX: 37.97 KG/M2 | HEIGHT: 72 IN | DIASTOLIC BLOOD PRESSURE: 68 MMHG | SYSTOLIC BLOOD PRESSURE: 132 MMHG

## 2022-12-29 DIAGNOSIS — B97.7 HPV IN FEMALE: ICD-10-CM

## 2022-12-29 PROCEDURE — 57454 COLPOSCOPY W/BIOPSY AND ECC- FUTURE: ICD-10-PCS | Mod: S$PBB,,, | Performed by: NURSE PRACTITIONER

## 2022-12-29 PROCEDURE — 99214 OFFICE O/P EST MOD 30 MIN: CPT | Mod: PBBFAC,25 | Performed by: NURSE PRACTITIONER

## 2022-12-29 PROCEDURE — 57454 BX/CURETT OF CERVIX W/SCOPE: CPT | Mod: PBBFAC | Performed by: NURSE PRACTITIONER

## 2022-12-29 PROCEDURE — 88305 TISSUE EXAM BY PATHOLOGIST: CPT | Performed by: PATHOLOGY

## 2022-12-29 PROCEDURE — 88305 TISSUE EXAM BY PATHOLOGIST: CPT | Mod: 26,,, | Performed by: PATHOLOGY

## 2022-12-29 PROCEDURE — 88305 TISSUE EXAM BY PATHOLOGIST: ICD-10-PCS | Mod: 26,,, | Performed by: PATHOLOGY

## 2022-12-29 PROCEDURE — 99999 PR PBB SHADOW E&M-EST. PATIENT-LVL IV: ICD-10-PCS | Mod: PBBFAC,,, | Performed by: NURSE PRACTITIONER

## 2022-12-29 PROCEDURE — 99999 PR PBB SHADOW E&M-EST. PATIENT-LVL IV: CPT | Mod: PBBFAC,,, | Performed by: NURSE PRACTITIONER

## 2022-12-29 PROCEDURE — 99499 NO LOS: ICD-10-PCS | Mod: S$PBB,,, | Performed by: NURSE PRACTITIONER

## 2022-12-29 PROCEDURE — 99499 UNLISTED E&M SERVICE: CPT | Mod: S$PBB,,, | Performed by: NURSE PRACTITIONER

## 2022-12-29 PROCEDURE — 57454 BX/CURETT OF CERVIX W/SCOPE: CPT | Mod: S$PBB,,, | Performed by: NURSE PRACTITIONER

## 2022-12-29 NOTE — PATIENT INSTRUCTIONS
Plan:   1)  Vulvar irritation:  --resolved     2)  Mixed urinary incontinence, urge < stress:    --control diabetes  --work on weight loss  --Empty bladder every 2-3 hours.  Empty well: wait a minute, lean forward on toilet.    --Avoid dietary irritants (see sheet).  Keep diary x 3-5 days to determine your irritants.  --start pelvic floor PT. Call to make appt:  [facility] (all take Medicaid):  FAUSTINO Tolentino/Parad[facility] Sanchez: (p) [phone]  . (f) [phone].    --URGE: increase mirabegron to 50 mg daily--make sure they're filling generic. Takes 2-4 weeks to see if will have effect.  For dry mouth: get sour, sugar free lozenge or gum.    --STRESS:  Pessary vs. Sling.               --need recheck for anterior prolapse before any surgery                  3)  well-woman:  Colpo today     4) RTC for pessary check

## 2022-12-30 ENCOUNTER — LAB VISIT (OUTPATIENT)
Dept: LAB | Facility: HOSPITAL | Age: 67
End: 2022-12-30
Attending: INTERNAL MEDICINE
Payer: MEDICARE

## 2022-12-30 DIAGNOSIS — E11.65 UNCONTROLLED TYPE 2 DIABETES MELLITUS WITH HYPERGLYCEMIA: ICD-10-CM

## 2022-12-30 DIAGNOSIS — R79.89 ABNORMAL LIVER FUNCTION TESTS: ICD-10-CM

## 2022-12-30 LAB
ALBUMIN SERPL BCP-MCNC: 3.7 G/DL (ref 3.5–5.2)
ALP SERPL-CCNC: 90 U/L (ref 55–135)
ALT SERPL W/O P-5'-P-CCNC: 45 U/L (ref 10–44)
ANION GAP SERPL CALC-SCNC: 10 MMOL/L (ref 8–16)
AST SERPL-CCNC: 50 U/L (ref 10–40)
BILIRUB SERPL-MCNC: 0.4 MG/DL (ref 0.1–1)
BUN SERPL-MCNC: 23 MG/DL (ref 8–23)
CALCIUM SERPL-MCNC: 9.8 MG/DL (ref 8.7–10.5)
CHLORIDE SERPL-SCNC: 103 MMOL/L (ref 95–110)
CO2 SERPL-SCNC: 26 MMOL/L (ref 23–29)
CREAT SERPL-MCNC: 1.2 MG/DL (ref 0.5–1.4)
EST. GFR  (NO RACE VARIABLE): 49.3 ML/MIN/1.73 M^2
ESTIMATED AVG GLUCOSE: 154 MG/DL (ref 68–131)
GLUCOSE SERPL-MCNC: 138 MG/DL (ref 70–110)
HBA1C MFR BLD: 7 % (ref 4–5.6)
POTASSIUM SERPL-SCNC: 4.2 MMOL/L (ref 3.5–5.1)
PROT SERPL-MCNC: 7.9 G/DL (ref 6–8.4)
SODIUM SERPL-SCNC: 139 MMOL/L (ref 136–145)

## 2022-12-30 PROCEDURE — 80053 COMPREHEN METABOLIC PANEL: CPT | Performed by: INTERNAL MEDICINE

## 2022-12-30 PROCEDURE — 83036 HEMOGLOBIN GLYCOSYLATED A1C: CPT | Performed by: INTERNAL MEDICINE

## 2022-12-30 PROCEDURE — 36415 COLL VENOUS BLD VENIPUNCTURE: CPT | Mod: PO | Performed by: INTERNAL MEDICINE

## 2022-12-31 NOTE — PROCEDURES
Colposcopy W/BIOPSY AND ECC- Future    Date/Time: 12/29/2022 10:30 AM  Performed by: Lizzeth Schmidt NP  Authorized by: Lizzeth Schmidt NP     Consent Done?:  Yes (Written)  Timeout:Immediately prior to procedure a time out was called to verify the correct patient, procedure, equipment, support staff and site/side marked as required  Prep:Patient was prepped and draped in the usual sterile fashion  Assistants?: Yes    List of assistants:  Zen AGARWAL I was present for the entire procedure.    Colposcopy Site:  Cervix  Position:  Supine  Acrowhite Lesion: No    Atypical Vessels: No    Transformation Zone Adequate?: No    Biopsy?: Yes         Location:  Cervix ((6 00))  ECC Performed?: Yes (tenaculum used to hold cervix still)    LEEP Performed?: No    Estimated blood loss (cc):  0   Patient tolerated the procedure well with no immediate complications.   Post-operative instructions were provided for the patient.   Patient was discharged and will follow up if any complications occur

## 2023-01-06 LAB
FINAL PATHOLOGIC DIAGNOSIS: NORMAL
GROSS: NORMAL
Lab: NORMAL

## 2023-01-09 ENCOUNTER — PATIENT MESSAGE (OUTPATIENT)
Dept: ADMINISTRATIVE | Facility: HOSPITAL | Age: 68
End: 2023-01-09
Payer: MEDICARE

## 2023-01-12 ENCOUNTER — PATIENT MESSAGE (OUTPATIENT)
Dept: UROGYNECOLOGY | Facility: CLINIC | Age: 68
End: 2023-01-12
Payer: MEDICARE

## 2023-02-03 ENCOUNTER — OFFICE VISIT (OUTPATIENT)
Dept: UROGYNECOLOGY | Facility: CLINIC | Age: 68
End: 2023-02-03
Payer: MEDICARE

## 2023-02-03 VITALS
BODY MASS INDEX: 39.68 KG/M2 | HEIGHT: 72 IN | WEIGHT: 293 LBS | SYSTOLIC BLOOD PRESSURE: 132 MMHG | DIASTOLIC BLOOD PRESSURE: 70 MMHG

## 2023-02-03 DIAGNOSIS — L29.2 VULVAR ITCHING: ICD-10-CM

## 2023-02-03 DIAGNOSIS — B37.9 YEAST INFECTION: Primary | ICD-10-CM

## 2023-02-03 DIAGNOSIS — N95.2 VAGINAL ATROPHY: ICD-10-CM

## 2023-02-03 DIAGNOSIS — N39.46 URINARY INCONTINENCE, MIXED: ICD-10-CM

## 2023-02-03 PROCEDURE — 99214 OFFICE O/P EST MOD 30 MIN: CPT | Mod: PBBFAC | Performed by: NURSE PRACTITIONER

## 2023-02-03 PROCEDURE — 99999 PR PBB SHADOW E&M-EST. PATIENT-LVL IV: CPT | Mod: PBBFAC,,, | Performed by: NURSE PRACTITIONER

## 2023-02-03 PROCEDURE — 99999 PR PBB SHADOW E&M-EST. PATIENT-LVL IV: ICD-10-PCS | Mod: PBBFAC,,, | Performed by: NURSE PRACTITIONER

## 2023-02-03 PROCEDURE — 99213 PR OFFICE/OUTPT VISIT, EST, LEVL III, 20-29 MIN: ICD-10-PCS | Mod: S$PBB,,, | Performed by: NURSE PRACTITIONER

## 2023-02-03 PROCEDURE — 99213 OFFICE O/P EST LOW 20 MIN: CPT | Mod: S$PBB,,, | Performed by: NURSE PRACTITIONER

## 2023-02-03 RX ORDER — SILVER SULFADIAZINE 10 G/1000G
CREAM TOPICAL 2 TIMES DAILY
COMMUNITY
Start: 2023-01-28 | End: 2023-05-26

## 2023-02-03 RX ORDER — NYSTATIN 100000 U/G
OINTMENT TOPICAL 2 TIMES DAILY
Qty: 30 G | Refills: 2 | Status: SHIPPED | OUTPATIENT
Start: 2023-02-03 | End: 2023-05-26

## 2023-02-03 RX ORDER — DOXYCYCLINE 100 MG/1
100 CAPSULE ORAL 2 TIMES DAILY
COMMUNITY
Start: 2023-01-28 | End: 2023-05-26

## 2023-02-03 NOTE — PATIENT INSTRUCTIONS
1)  Vulvar irritation:  --nystatin cream twice daily as needed     2)  Mixed urinary incontinence, urge < stress:    --control diabetes  --work on weight loss  --Empty bladder every 2-3 hours.  Empty well: wait a minute, lean forward on toilet.    --Avoid dietary irritants (see sheet).  Keep diary x 3-5 days to determine your irritants.  --start pelvic floor PT.   [facility] Tolentino/[facility]: (p) [phone]  . (f) [phone].    --URGE: continue mirabegron to 50 mg daily  ---STRESS:  Pessary vs. Sling                      --need recheck for anterior prolapse before any surgery               3) cystocele/ rectocele  --continue #3 ring with support pessary    4) well-woman:  Cxbx/ ecc  1. CERVICAL BIOPSY AT 6 O'CLOCK SHOWS SMALL DETACHED FRAGMENTS OF SQUAMOUS   EPITHELIUM WITH MILD DYSPLASIA (CIN1) AND INFLAMMATION.   2. ENDOCERVICAL CURETTAGE SHOWS DETACHED FRAGMENTS OF SQUAMOUS EPITHELIUM   MILD DYSPLASIA AND KOILOCYTOTIC ATYPIA (CIN1).      4) RTC for pessary check

## 2023-02-03 NOTE — PROGRESS NOTES
Urogyn follow up  02/03/2023  .  [facility] - UROGYNECOLOGY  [address] [address]    Sandi Dobson  [id number]  1955      Sandi Dobson is a 67 y.o. here for a urogyn follow up of mixed incontinence and vulvar irritation.    Last HPI from 10/20/2022     1)  UI:  (+) RUDY (bend, cough, sneeze) = (+) UUI  X years, worsening. Taking mirabegron 25 mg -- unsure if helps. Tried PFPT ([place]) in 2020.    (--) pads--should be. Sleeps in lazy boy recliner, has a dog pad underneath her--has a few drops of urine. Also has pad in wheelchair. Leaks with transferring.  Daytime frequency: Q 2 hours. Tries to hold at least that.   Nocturia: Yes: 1/night.   (--) dysuria,  (--) hematuria,  (+) reported frequent UTIs. Sees PCP--sends in ABX/pyridium without testing. Not sure C&S are sent.  (--) complete bladder emptying. +PV urgency/hesitancy. DV with small 2nd volume.      --9/2022 [facility] ([facility]):  presents for follow up with acute vulvitis, stress urinary incontinence and overactive bladder.. Patient is being currently treated for chronic and acute vulvitis and vulvar pruritus. She has been using RO soaks and topical steroids. She noticed only mild improvement and continues to have itching and scratching.    And also presents for follow-up of her urodynamics performed on August 22nd. The study revealed a normally sensate bladder of normal capacity and compliance. She did exhibit stress urinary incontinence at a fill of 392 cc with Valsalva leak point pressures of 27 and 34. She voids by detrusor contraction to completion with a postvoid residual of 60 cc. The bladder was stable throughout there was no evidence of DO or DI.      1. Overactive bladder  Reviewed urodynamics study with the patient which failed to reveal detrusor overactivity or detrusor overactivity incontinence.    2. 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 We discussed treatment options including possible mid urethral sling with bulking or staged approach with bulking later. Reviewed the nature and technique of the procedure, recovery, and potential risks and complications. Recommend a staged approach to avoid urinary retention. Patient is given literature on mid urethral sling and on bulking procedure.    3. Vulvar pruritus  Patient will continue to use the birth soaks in the topical hydrocortisone as well as her Vaseline mildly it barrier an effort to remove the condition of the skin of the vulva perianal area and upper thighs. We did discuss with her avoiding scratching the areas I will only incur further itching. She is also using some cold packs to help reduce the sensation. She is return to the office in 1 month to recheck vulva and we consider her treatment options for stress urinary incontinence.     2)  POP:  Absent.  (--) vaginal bleeding. (--) vaginal discharge. (--) sexually active.  (--) dyspareunia.  (+)  Vaginal dryness.  (--) vaginal estrogen use.      3)  BM:  (--) constipation/straining.  (--) chronic diarrhea. (--) hematochezia.  (--) fecal incontinence.  (--) fecal smearing/urgency.  (+) complete evacuation.      4)  Vulvar irritation:  --using hydrocortisone cream daily  --scratches a lot  --uses ice packs  --using domeboro (aluminum and calcium) solution to soak  --has tried vaseline and EVVO  --thinks this is from staying wet with urine    Changes from last visit:  1)  Mixed urinary incontinence, urge < stress:    --denies UI  --voiding every 6 hours  --voiding 1/ night    2)vaginal atrophy  -- using vaginal estrogen cream twice weekly     3)midline cystocele/ rectocele  --using pessary  --denies pain, bleeding, or discharge    4)vulvar itching  -- 12/2021 hgba1c 7       Past Medical History:   Diagnosis Date    History of colonic polyps 01/29/2021     11/19 [facility] - Findings: 2 sessile polyps hepatic flexure and rectosigmoid      Hypertension     Mood disorder 01/29/2021    Uncontrolled type 2 diabetes mellitus with hyperglycemia 01/29/2021   --B knee OA: uses wheelchair; has trouble straightening knees; is not able to walk much at all     --mood disorder: h/o situational depression (mother was killed by drunk )--resolved; followed by psych (Radha); takes effexor XR; currently stable     --DM2:  most recent reported as 13%+ (could not have cataract surgery 8/2022)--now 7; no secondary disease; ? Mild B foot neuropathy    History reviewed. No pertinent surgical history.    Family History   Problem Relation Age of Onset    Vision loss Mother     Diabetes Father     Cancer Father     Hearing loss Maternal Aunt     Hearing loss Maternal Grandmother     Stroke Paternal Grandmother        Social History     Socioeconomic History    Marital status:    Tobacco Use    Smoking status: Never    Smokeless tobacco: Never   Substance and Sexual Activity    Drug use: Not Currently       Current Outpatient Medications   Medication Sig Dispense Refill    amLODIPine (NORVASC) 5 MG tablet Take 1 tablet (5 mg total) by mouth daily as needed. 90 tablet 90    betamethasone valerate (LUXIQ) 0.12 % foam Apply topically.      desonide (DESOWEN) 0.05 % cream SMARTSIG:Topical 3-4 Times Daily PRN      doxycycline (VIBRAMYCIN) 100 MG Cap Take 100 mg by mouth 2 (two) times daily.      estradioL (ESTRACE) 0.01 % (0.1 mg/gram) vaginal cream dime-sized amount with finger in vagina/around external parts nightly. 42.5 g 11    ferrous fumarate-vitamin C 200 mg (65 mg iron)-25 mg TbSR Take by mouth.      flash glucose scanning reader (FREESTYLE NINA 14 DAY READER) Misc 1 each by Other route.      flash glucose sensor (FREESTYLE NINA 14 DAY SENSOR) Kit 1 kit by Other route.      glipiZIDE (GLUCOTROL) 10 MG tablet Take 1 tablet (10 mg total) by mouth 2 (two) times daily with meals. 180  tablet 12    ivermectin (SOOLANTRA) 1 % Crea Apply topically.      ketoconazole (NIZORAL) 2 % cream Apply topically 2 (two) times daily as needed.      ketoconazole (NIZORAL) 2 % shampoo Apply topically twice a week.      metFORMIN (GLUCOPHAGE) 500 MG tablet 2 pills BID2 pills  tablet 12    mirabegron (MYRBETRIQ) 50 mg Tb24 Take 1 tablet (50 mg total) by mouth once daily. 90 tablet 4    pioglitazone (ACTOS) 45 MG tablet Take 1 tablet (45 mg total) by mouth once daily. 90 tablet 4    SSD 1 % cream Apply topically 2 (two) times daily.      tacrolimus (PROTOPIC) 0.1 % ointment APPLY TOPICALLY TO THE AFFECTED AREA DAILY      venlafaxine (EFFEXOR-XR) 150 MG Cp24 Take  3 at night 270 capsule 12    nystatin (MYCOSTATIN) ointment Apply topically 2 (two) times daily. 30 g 2    omeprazole (PRILOSEC) 10 MG capsule Take 2 capsules (20 mg total) by mouth 2 (two) times daily before meals. 120 capsule 11     No current facility-administered medications for this visit.       Review of patient's allergies indicates:  No Known Allergies    Well woman:  Pap test: unknown.  History of abnormal paps: No.  History of STIs:  No  Mammogram: needs to schedule  Colonoscopy: Date of last: 2019.  Result:  Findings: 2 sessile polyps hepatic flexure and rectosigmoid.  Repeat due:  2029.    DEXA:  needs to schedule    ROS:  As per HPI.      Exam  /70 (BP Location: Right arm, Patient Position: Sitting, BP Method: Large (Manual))   Ht 6' (1.829 m)   Wt (!) 144 kg (317 lb 7.4 oz)   LMP  (LMP Unknown)   BMI 43.06 kg/m²   General: alert and oriented, no acute distress  Respiratory: normal respiratory effort  Abd: soft, non-tender, non-distended    Pelvic  Ext. Genitalia: normal external genitalia. Normal bartholin's and skeens glands.    Vagina: + atrophy. Normal vaginal mucosa without lesions. No discharge noted.   Non-tender bladder base without palpable mass.  #3 ring with support pessary in place  Cervix: no lesions  Uterus:   uterus is normal size, shape, consistency and nontender   Urethra: no masses or tenderness  Urethral meatus: no lesions, caruncle or prolapse.    Pessary removed without difficulty. Washed with soap and water. Reinserted without difficulty.     Impression  1. Yeast infection  nystatin (MYCOSTATIN) ointment      2. Urinary incontinence, mixed        3. Vaginal atrophy        4. Vulvar itching          We reviewed the above issues and discussed options for short-term versus long-term management of her problems.   Plan:   1)  Vulvar irritation:  --nystatin cream twice daily as needed     2)  Mixed urinary incontinence, urge < stress:    --control diabetes  --work on weight loss  --Empty bladder every 2-3 hours.  Empty well: wait a minute, lean forward on toilet.    --Avoid dietary irritants (see sheet).  Keep diary x 3-5 days to determine your irritants.  --start pelvic floor PT.   FAUSTINO Tolentino/Paradise Sanchez: (p) [phone]  . (f) [phone].    --URGE: continue mirabegron to 50 mg daily  ---STRESS:  Pessary vs. Sling                      --need recheck for anterior prolapse before any surgery               3) cystocele/ rectocele  --continue #3 ring with support pessary    4) well-woman:  Cxbx/ ecc  1. CERVICAL BIOPSY AT 6 O'CLOCK SHOWS SMALL DETACHED FRAGMENTS OF SQUAMOUS   EPITHELIUM WITH MILD DYSPLASIA (CIN1) AND INFLAMMATION.   2. ENDOCERVICAL CURETTAGE SHOWS DETACHED FRAGMENTS OF SQUAMOUS EPITHELIUM   MILD DYSPLASIA AND KOILOCYTOTIC ATYPIA (CIN1).      4) RTC for pessary check      I spent a total of 20 minutes on the day of the visit.  This includes face to face time and non-face to face time preparing to see the patient (eg, review of tests), obtaining and/or reviewing separately obtained history, documenting clinical information in the electronic or other health record, independently interpreting results and communicating results to the patient/family/caregiver, or care coordinator.     Lizzeth Schmidt,  FNP-BC  [facility]  Division of Female Pelvic Medicine and Reconstructive Surgery  Department of Obstetrics & Gynecology

## 2023-03-16 ENCOUNTER — OFFICE VISIT (OUTPATIENT)
Dept: UROGYNECOLOGY | Facility: CLINIC | Age: 68
End: 2023-03-16
Payer: MEDICARE

## 2023-03-16 VITALS
SYSTOLIC BLOOD PRESSURE: 129 MMHG | BODY MASS INDEX: 43.06 KG/M2 | DIASTOLIC BLOOD PRESSURE: 59 MMHG | HEIGHT: 72 IN | HEART RATE: 75 BPM

## 2023-03-16 DIAGNOSIS — N90.89 VULVAR IRRITATION: Primary | ICD-10-CM

## 2023-03-16 DIAGNOSIS — N81.11 CYSTOCELE, MIDLINE: ICD-10-CM

## 2023-03-16 DIAGNOSIS — N39.46 MIXED STRESS AND URGE URINARY INCONTINENCE: ICD-10-CM

## 2023-03-16 DIAGNOSIS — N81.6 RECTOCELE, FEMALE: ICD-10-CM

## 2023-03-16 DIAGNOSIS — N95.2 VAGINAL ATROPHY: ICD-10-CM

## 2023-03-16 PROCEDURE — 99214 PR OFFICE/OUTPT VISIT, EST, LEVL IV, 30-39 MIN: ICD-10-PCS | Mod: S$PBB,,, | Performed by: OBSTETRICS & GYNECOLOGY

## 2023-03-16 PROCEDURE — 99999 PR PBB SHADOW E&M-EST. PATIENT-LVL V: CPT | Mod: PBBFAC,,, | Performed by: OBSTETRICS & GYNECOLOGY

## 2023-03-16 PROCEDURE — 99214 OFFICE O/P EST MOD 30 MIN: CPT | Mod: S$PBB,,, | Performed by: OBSTETRICS & GYNECOLOGY

## 2023-03-16 PROCEDURE — 99999 PR PBB SHADOW E&M-EST. PATIENT-LVL V: ICD-10-PCS | Mod: PBBFAC,,, | Performed by: OBSTETRICS & GYNECOLOGY

## 2023-03-16 PROCEDURE — 99215 OFFICE O/P EST HI 40 MIN: CPT | Mod: PBBFAC | Performed by: OBSTETRICS & GYNECOLOGY

## 2023-03-16 NOTE — PROGRESS NOTES
Urogyn follow up  02/03/2023  .  [facility] - UROGYNECOLOGY  [address] [address]    Sandi Dobson  [id number]  1955      Sandi Dobson is a 67 y.o. here for a urogyn follow up of mixed incontinence and vulvar irritation.  Last visit with RAKESH Schmidt in 2/2023.     Last HPI from 10/20/2022     1)  UI:  (+) RUDY (bend, cough, sneeze) = (+) UUI  X years, worsening. Taking mirabegron 25 mg -- unsure if helps. Tried PFPT ([place]) in 2020.    (--) pads--should be. Sleeps in lazy boy recliner, has a dog pad underneath her--has a few drops of urine. Also has pad in wheelchair. Leaks with transferring.  Daytime frequency: Q 2 hours. Tries to hold at least that.   Nocturia: Yes: 1/night.   (--) dysuria,  (--) hematuria,  (+) reported frequent UTIs. Sees PCP--sends in ABX/pyridium without testing. Not sure C&S are sent.  (--) complete bladder emptying. +PV urgency/hesitancy. DV with small 2nd volume.      --9/2022 [facility] [place] ([place]):  presents for follow up with acute vulvitis, stress urinary incontinence and overactive bladder.. Patient is being currently treated for chronic and acute vulvitis and vulvar pruritus. She has been using RO soaks and topical steroids. She noticed only mild improvement and continues to have itching and scratching.    And also presents for follow-up of her urodynamics performed on August 22nd. The study revealed a normally sensate bladder of normal capacity and compliance. She did exhibit stress urinary incontinence at a fill of 392 cc with Valsalva leak point pressures of 27 and 34. She voids by detrusor contraction to completion with a postvoid residual of 60 cc. The bladder was stable throughout there was no evidence of DO or DI.      1. Overactive bladder  Reviewed urodynamics study with the patient which failed to reveal detrusor overactivity or detrusor overactivity incontinence.    2. RUDY (stress urinary incontinence, female)  Reviewed urodynamic study with the  patient which did reveal stress urinary incontinence at low urethral pressures consistent with possible intrinsic sphincter deficiency. We discussed treatment options including possible mid urethral sling with bulking or staged approach with bulking later. Reviewed the nature and technique of the procedure, recovery, and potential risks and complications. Recommend a staged approach to avoid urinary retention. Patient is given literature on mid urethral sling and on bulking procedure.    3. Vulvar pruritus  Patient will continue to use the birth soaks in the topical hydrocortisone as well as her Vaseline mildly it barrier an effort to remove the condition of the skin of the vulva perianal area and upper thighs. We did discuss with her avoiding scratching the areas I will only incur further itching. She is also using some cold packs to help reduce the sensation. She is return to the office in 1 month to recheck vulva and we consider her treatment options for stress urinary incontinence.     2)  POP:  Absent.  (--) vaginal bleeding. (--) vaginal discharge. (--) sexually active.  (--) dyspareunia.  (+)  Vaginal dryness.  (--) vaginal estrogen use.   --POP-Q:  Aa -1; Ba -1; C -5; Ap -1; Bp -; D -6.  Genital hiatus 4, perineal body 2, total vaginal length 10-11.    3)  BM:  (--) constipation/straining.  (--) chronic diarrhea. (--) hematochezia.  (--) fecal incontinence.  (--) fecal smearing/urgency.  (+) complete evacuation.      4)  Vulvar irritation:  --using hydrocortisone cream daily  --scratches a lot  --uses ice packs  --using domeboro (aluminum and calcium) solution to soak  --has tried vaseline and EVVO  --thinks this is from staying wet with urine    Changes from last visit:  1)  Mixed urinary incontinence, urge < stress:    --denies UI  --voiding every 6 hours  --voiding 1/ night  --taking mirabegron    2)vaginal atrophy  -- using vaginal estrogen cream twice weekly     3)midline cystocele/ rectocele  --using  pessary  --denies pain, bleeding, or discharge    4)vulvar itching  -- 12/2021 hgba1c 7     Past Medical History:   Diagnosis Date    History of colonic polyps 01/29/2021 11/19 [facility] - Findings: 2 sessile polyps hepatic flexure and rectosigmoid      Hypertension     Mood disorder 01/29/2021    Uncontrolled type 2 diabetes mellitus with hyperglycemia 01/29/2021   --B knee OA: uses wheelchair; has trouble straightening knees; is not able to walk much at all     --mood disorder: h/o situational depression (mother was killed by drunk )--resolved; followed by psych (Radha); takes effexor XR; currently stable     --DM2:    Lab Results   Component Value Date    HGBA1C 7.0 (H) 12/30/2022   --has been as high as 13%+ (could not have cataract surgery 8/2022)--now 7 on oral agents; no secondary disease; ? Mild B foot neuropathy    History reviewed. No pertinent surgical history.  Xlap/Pfannenstiel for removal of benign/congenital tumor--L fallopian tube was looped around thisc    Family History   Problem Relation Age of Onset    Vision loss Mother     Diabetes Father     Cancer Father     Hearing loss Maternal Aunt     Hearing loss Maternal Grandmother     Stroke Paternal Grandmother        Social History     Socioeconomic History    Marital status:    Tobacco Use    Smoking status: Never    Smokeless tobacco: Never   Substance and Sexual Activity    Drug use: Not Currently       Current Outpatient Medications   Medication Sig Dispense Refill    amLODIPine (NORVASC) 5 MG tablet Take 1 tablet (5 mg total) by mouth daily as needed. 90 tablet 90    betamethasone valerate (LUXIQ) 0.12 % foam Apply topically.      desonide (DESOWEN) 0.05 % cream SMARTSIG:Topical 3-4 Times Daily PRN      doxycycline (VIBRAMYCIN) 100 MG Cap Take 100 mg by mouth 2 (two) times daily.      estradioL (ESTRACE) 0.01 % (0.1 mg/gram) vaginal cream dime-sized amount with finger in vagina/around external parts nightly. 42.5 g 11    ferrous  fumarate-vitamin C 200 mg (65 mg iron)-25 mg TbSR Take by mouth.      flash glucose scanning reader (FREESTYLE NINA 14 DAY READER) Misc 1 each by Other route.      flash glucose sensor (FREESTYLE NINA 14 DAY SENSOR) Kit 1 kit by Other route.      glipiZIDE (GLUCOTROL) 10 MG tablet Take 1 tablet (10 mg total) by mouth 2 (two) times daily with meals. 180 tablet 12    ivermectin (SOOLANTRA) 1 % Crea Apply topically.      ketoconazole (NIZORAL) 2 % cream Apply topically 2 (two) times daily as needed.      ketoconazole (NIZORAL) 2 % shampoo Apply topically twice a week.      metFORMIN (GLUCOPHAGE) 500 MG tablet 2 pills BID2 pills  tablet 12    mirabegron (MYRBETRIQ) 50 mg Tb24 Take 1 tablet (50 mg total) by mouth once daily. 90 tablet 4    nystatin (MYCOSTATIN) ointment Apply topically 2 (two) times daily. 30 g 2    omeprazole (PRILOSEC) 10 MG capsule Take 2 capsules (20 mg total) by mouth 2 (two) times daily before meals. 120 capsule 11    pioglitazone (ACTOS) 45 MG tablet Take 1 tablet (45 mg total) by mouth once daily. 90 tablet 4    SSD 1 % cream Apply topically 2 (two) times daily.      tacrolimus (PROTOPIC) 0.1 % ointment APPLY TOPICALLY TO THE AFFECTED AREA DAILY      venlafaxine (EFFEXOR-XR) 150 MG Cp24 Take  3 at night 270 capsule 12     No current facility-administered medications for this visit.       Review of patient's allergies indicates:  No Known Allergies    Well woman:  Pap test: unknown.  History of abnormal paps: No.  History of STIs:  No  Mammogram: needs to schedule  Colonoscopy: Date of last: 2019.  Result:  Findings: 2 sessile polyps hepatic flexure and rectosigmoid.  Repeat due:  2029.    DEXA:  needs to schedule    ROS:  As per HPI.      Exam  BP (!) 129/59 (BP Location: Right arm, Patient Position: Sitting, BP Method: Medium (Automatic))   Pulse 75   Ht 6' (1.829 m)   LMP  (LMP Unknown)   BMI 43.06 kg/m²   General: alert and oriented, no acute distress  Respiratory: normal  respiratory effort  Abd: soft, non-tender, non-distended; +rectus diastasis, +umbilical hernia  INC: Pfannenstiel well-healed    Pelvic  Ext. Genitalia: normal external genitalia. Normal bartholin's and skeens glands.    Vagina: + atrophy. Normal vaginal mucosa without lesions. No discharge noted.   Non-tender bladder base without palpable mass.  #3 ring with support pessary in place  Cervix: no lesions  Uterus:  uterus is normal size, shape, consistency and nontender   Urethra: no masses or tenderness  Urethral meatus: no lesions, caruncle or prolapse.    --POP-Q:  Aa -1; Ba -1; C -5; Ap -1; Bp -1; D -6 to -7.  Genital hiatus 4, perineal body 2, total vaginal length 10-11.    Pessary removed without difficulty. Washed with soap and water. Reinserted without difficulty.     Impression  1. Vulvar irritation        2. Mixed stress and urge urinary incontinence        3. Cystocele, midline        4. Rectocele, female        5. Vaginal atrophy            We reviewed the above issues and discussed options for short-term versus long-term management of her problems.   Plan:   1)  Vulvar irritation:  --nystatin cream twice daily as needed     2)  Mixed urinary incontinence, urge < stress:    --continue to control diabetes: A1c 7%  --continue to work on weight loss  --Empty bladder every 2-3 hours.  Empty well: wait a minute, lean forward on toilet.    --Avoid dietary irritants (see sheet).  Keep diary x 3-5 days to determine your irritants.  --could not start pelvic floor PT. [facility] location is closed. Has cataracts and trouble driving.   --Hold off for now  --URGE: continue mirabegron to 50 mg daily  ---STRESS:  Pessary vs. Sling                      --need recheck for anterior prolapse before any surgery            3) cystocele/ rectocele  --for now continue #3 ring with support pessary  --consider surgical correction (A&P repair if possible since uterus seems supported) in future   --if uterus not well-supported, could  do TVH--high risk if need to convert to LSC or open   --need to repeat pap/HPV in 12/2023    --if still +, could we do LEEP instead? (Will ask ONC)   --need to do office cystometry to see if need sling    --make sure A1c <7.5%   --need to get clearance per PCP + labs (A1c, CBC, CMP, T&S)/EKG    4) well-woman:  --10/2022 pap normal/HPV 16+  --12/2022 colposcopy  Colposcopy Site:  Cervix  Position:  Supine  Acrowhite Lesion: No    Atypical Vessels: No    Transformation Zone Adequate?: No    Biopsy?: Yes         Location:  Cervix ((6 00))  ECC Performed?: Yes (tenaculum used to hold cervix still)    LEEP Performed?: No    Estimated blood loss (cc):  0  1. CERVICAL BIOPSY AT 6 O'CLOCK SHOWS SMALL DETACHED FRAGMENTS OF SQUAMOUS   EPITHELIUM WITH MILD DYSPLASIA (CIN1) AND INFLAMMATION.   2. ENDOCERVICAL CURETTAGE SHOWS DETACHED FRAGMENTS OF SQUAMOUS EPITHELIUM   MILD DYSPLASIA AND KOILOCYTOTIC ATYPIA (CIN1).   --continue to control diabetes  --repeat pap/HPV 12/2023     4) RTC for pessary check in 3 months.     Approx 30 min spent in consult 90% in discussion.     [facility]  Division of Female Pelvic Medicine and Reconstructive Surgery  Department of Obstetrics & Gynecology

## 2023-03-16 NOTE — PATIENT INSTRUCTIONS
1)  Vulvar irritation:  --nystatin cream twice daily as needed     2)  Mixed urinary incontinence, urge < stress:    --continue to control diabetes: A1c 7%  --continue to work on weight loss  --Empty bladder every 2-3 hours.  Empty well: wait a minute, lean forward on toilet.    --Avoid dietary irritants (see sheet).  Keep diary x 3-5 days to determine your irritants.  --could not start pelvic floor PT. [facility] location is closed. Has cataracts and trouble driving.   --Hold off for now  --URGE: continue mirabegron to 50 mg daily  ---STRESS:  Pessary vs. Sling                      --need recheck for anterior prolapse before any surgery            3) cystocele/ rectocele  --for now continue #3 ring with support pessary  --consider surgical correction (A&P repair if possible since uterus seems supported) in future   --if uterus not well-supported, could do TVH--high risk if need to convert to LSC or open   --need to repeat pap/HPV in 12/2023    --if still +, could we do LEEP instead? (Will ask ONC)   --need to do office cystometry to see if need sling    --make sure A1c <7.5%   --need to get clearance per PCP + labs (A1c, CBC, CMP, T&S)/EKG    4) well-woman:  --10/2022 pap normal/HPV 16+  --12/2022 colposcopy  Colposcopy Site:  Cervix  Position:  Supine  Acrowhite Lesion: No    Atypical Vessels: No    Transformation Zone Adequate?: No    Biopsy?: Yes         Location:  Cervix ((6 00))  ECC Performed?: Yes (tenaculum used to hold cervix still)    LEEP Performed?: No    Estimated blood loss (cc):  0  1. CERVICAL BIOPSY AT 6 O'CLOCK SHOWS SMALL DETACHED FRAGMENTS OF SQUAMOUS   EPITHELIUM WITH MILD DYSPLASIA (CIN1) AND INFLAMMATION.   2. ENDOCERVICAL CURETTAGE SHOWS DETACHED FRAGMENTS OF SQUAMOUS EPITHELIUM   MILD DYSPLASIA AND KOILOCYTOTIC ATYPIA (CIN1).   --continue to control diabetes  --repeat pap/HPV 12/2023     4) RTC for pessary check in 3 months.

## 2023-04-18 LAB
LEFT EYE DM RETINOPATHY: NEGATIVE
RIGHT EYE DM RETINOPATHY: NEGATIVE

## 2023-05-17 NOTE — PLAN OF CARE
OUTPATIENT PHYSICAL THERAPY EVALUATION        Name: Sandi Dobson  [facility] Number: [id number]    Therapy Diagnosis: No diagnosis found.  Physician: Camilla Ortega MD    Physician Orders: PT Eval and Treat   Medical Diagnosis:   N39.3 (ICD-10-CM) - Female stress incontinence   N39.41 (ICD-10-CM) - Urge incontinence   R39.15 (ICD-10-CM) - Urgency of urination   Evaluation Date: 1/10/2020  Authorization: 20 visits  Plan of Care Certification Period: 1/10/2020 to 20    Today's Date: 1/10/2020  Visit #:   Time In: 1:10  Time Out: 2:10  Total Billable Time: 60 minutes    Precautions: universal      HISTORY      Sandi is a 64 y.o. female evaluated on 01/10/2020    Physician:  Camilla Ortega MD   Diagnosis: No diagnosis found.   Treatment ordered: Physical Therapy  Medical History: No past medical history on file.   Surgical History: No past surgical history on file.   Medications:   No current outpatient medications on file.     No current facility-administered medications for this visit.        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 During first childbirth pt had an episiotomy that was cut to the anal sphincter.  Experiencing UI with sneezing, moving into and out of WC, or with urgency. Pt is urinating about every 2-3 hours and feels like she can't fully empty. States that she had a tumor removal in abdominal area at age 20. Pt's menopause age was 49 years old. Osteoarthritis in both knees and she's been in a manual WC for about 20 years and no longer ambulates.     Activities that cause symptoms: strong urge to go, changing positions (ex. sit to  High Dose Vitamin A Pregnancy And Lactation Text: High dose vitamin A therapy is contraindicated during pregnancy and breast feeding. Aklief counseling:  Patient advised to apply a pea-sized amount only at bedtime and wait 30 minutes after washing their face before applying.  If too drying, patient may add a non-comedogenic moisturizer.  The most commonly reported side effects including irritation, redness, scaling, dryness, stinging, burning, itching, and increased risk of sunburn.  The patient verbalized understanding of the proper use and possible adverse effects of retinoids.  All of the patient's questions and concerns were addressed. stand), with cough/sneeze/laugh/yell and light activity    Previous treatment included: N/A     Reproductive Symptoms  Sexually active: No  Pain: No  Dysfunction: No    Bladder/Bowel History: trouble emptying bladder completely  Frequency of urination:   Daytime: every 2-3 hours            Nighttime: 1-2 a time   Difficulty initiating urine stream: No  Urine stream: strong  Complete emptying: No  Bladder leakage: Yes  Frequency of incidents: 3 times a day   Amount leaked (urine): few drops and small squirt   Urinary Urgency: Yes  Able to delay the urge for at least 0 minutes.    Frequency of bowel movements: once a day  Difficulty initiating BM: No    Form of protection: none  Number of pads required in 24 hours: N/A    Diet / Behavior  Types of fluid intake: water, coffee, milk and tea  Diet: 1/2 cup-1 cup of fiber one cereal, cranberries, blueberries, almonds, walnuts, broccoli, salmon, yogurt, popcorn, boiled egg, cottage cheese   Current exercise: N/A   Supplements: aged garlic tablets, probiotic, iron supplement     Social History  Previous treatment included medical management. No PT this calendar year.      Abuse History: N/A     Occupation: Pt works as a retired and job-related duties include.    Home Environment: Pt lives alone in a one story house.     PLOF: Pt was independent with all ADLs without reports of incontinence or pain.    Pts goals: Continence and decreased stress incontinence       OBJECTIVE     ORTHO SCREEN  Posture: flexed posturing, mild rounded shoulders  Pelvic alignment: no sign of deviations noted in supine      ABDOMINALS  Scarring: Large lower abdominal scar from tumor removal   Diastasis: noticeably doming present above umbilicus with patient transferring to bed and trunk flexion   Palpation: no tenderness, incision not tender  Observation: large abdominal pannus as well as upper thigh/groin crease, checked for skin quality, some erythema but no breakdown, pt reports cleaning in  crease and applies olive oil    VAGINAL PELVIC FLOOR EXAM    EXTERNAL ASSESSMENT  Introitus: WNL  Skin condition: WNL  Scarring: episiotomy scar noted   Sensation: WNL   Pain:  none  Pelvic Clock Assessment: no tenderness noticed   Voluntary contraction: visible lift  Quality of contraction: WNL   Specificity: abdominals, adductors, gluts   Voluntary relaxation: visible drop  Involuntary contraction: visible lift  Bearing down: nil  Perineal descent: absent  Comments: some redness and dryness around ischial tuberosities but no signs of skin breakdown from moisture    INTERNAL ASSESSMENT  Pain: none   Sensation: able to localize pressure appropriately  Vaginal vault: WNL   Muscle Bulk: atrophy   Muscle Power: 3/5 left, 2 +/5 right   Muscle Endurance: 10 sec  # Reps To Fatigue: 2    Fast Contractions: 9     Quality of contraction: WNL   Specificity: abdominals   Coordination: tends to hold breath during PFM contration   Prolapse check:none  Comments: with urethral palpation noted flattening and widening along length consistent with hormonal changes  Initially no activation of UG diaphragm mm, improved with VC      TREATMENT    Treatment Time In: 1:45   Treatment Time Out: 2:10  Total Treatment time separate from Evaluation: 25 minutes    Neuromuscular Re-education to develop Coordination and Control for 15 minutes including: Pelvic muscle contraction focused on activation of three layers of muscle , quick flicks, and long hold cues for isolation and breathing throughout.     Therapeutic Activity Patient participated in dynamic functional therapeutic activities to improve functional performance for 10 minutes. Including: Education as described below.     Education provided:   - Instructed on general anatomy/physiology  - Role of therapy in multi-disciplinary team  - Instructed in purpose of physical therapy and the benefits/risks of treatment  - Instructed in alternative methods of treatment  - Risks of refusing  Azithromycin Pregnancy And Lactation Text: This medication is considered safe during pregnancy and is also secreted in breast milk. treatment  - POC and goals for therapy  - Instructed on general anatomy/physiology of urinary/bowel system     Also educated in: anatomy/physiology of pelvic floor, vulvar irritants, hygiene of pelvic floor, kegels and fluid intake/dietary modifications    Patient/guardian agreed to treatment plan.     No spiritual or educational barriers to learning provided    Written Home Exercises Provided:   Pt was not provided with a written copy of exercises to perform at home. Sandi demonstrated good  understanding of the education provided.     See EMR under Patient Instructions for exercises provided 1/10/2020.    ASSESSMENT      This is a 64 y.o. female referred to outpatient physical therapy and presents with a medical diagnosis of female stress incontinence, urge incontinence, urgency of urination. Pt demonstrated accessory muscle usage with performing pelvic floor muscle contraction at first, but was able to diminish accessory use after VC. Pt demonstrated good coordination with pelvic floor quick contractions with decreased strength noted, R>L. Pt was concerned with episiotomy scar, after further evaluation the scar healed appropriately. Pt's vulvar tissue showed mild atropy with no redness or rash noted. Atrophy was to be expected due to 15 years post menopausal, pt reported she would be seeing gynecology soon so PT recommended discuss estrogen cream due to possible genitourinary syndrome of menopause. Pt has significant abdominal wall weakness with diastasis noted as well as history of major abdominal surgery. Additionally, she is very sedentary and non-ambulatory, currently no signs of skin breakdown in vulvar area, but she is at risk. She will benefit from general exercise program in addition to PFME. Reports poor fluid intake and will also benefit from education on normal bladder habits. Pt is expected to benefit form skilled PT services for improved continence for participation in ADLs and decreased risk of skin  "breakdown.     Educational/Spiritual/Cultural needs: none  Pt's spiritual, cultural and educational needs considered and pt agreeable to plan of care and goals as stated below:     Abuse/Neglect: no signs  Nutritional Status: Obese    Fall Risk: Yes    Anticipated Barriers for therapy: Decreased mobility, uses manual WC     Medical Necessity is demonstrated by the following  History  Co-morbidities and personal factors that may impact the plan of care Co-morbidities:   high BMI, prior abdominal surgery and transportation assistance required    Personal Factors:   no deficits     moderate   Examination  Body Structures and Functions, activity limitations and participation restrictions that may impact the plan of care Body Regions/Systems/Functions:  decreased pelvic muscle strength, decreased endurance of the pelvic muscles, increased frequency of urination and poor fluid intake     Activity Limitations:  urgency , full bladder emptying and incontinence with ADLs    Participation Restrictions:  exercise restrictions due to incontinence          moderate   Clinical Presentation stable and uncomplicated low   Decision Making/ Complexity Score: low     Short Term Goals: 6 weeks   Pt to be edu pelvic muscle bracing and be able to consistently perform correctly and quickly to help decrease incontinence with cough/laugh/sneeze.  Pt to perform "the knack" prior to coughing, laughing or sneezing to decrease risk of incontinence.  Pt to be able to perform a 10 second kegel x 10 reps to demonstrate improving strength and endurance needed for continence.  Pt to demonstrate being able to correctly and consistently perform a kegel which is needed  to increase pelvic floor muscle coordination and strength needed for continence.  Pt to be able to delay the urge to urinate at least 5 minutes with a strong urge to urinate in order to make it to the bathroom without leaking.  Pt to report being able to transfer from sitting to " Doxycycline Counseling:  Patient counseled regarding possible photosensitivity and increased risk for sunburn.  Patient instructed to avoid sunlight, if possible.  When exposed to sunlight, patients should wear protective clothing, sunglasses, and sunscreen.  The patient was instructed to call the office immediately if the following severe adverse effects occur:  hearing changes, easy bruising/bleeding, severe headache, or vision changes.  The patient verbalized understanding of the proper use and possible adverse effects of doxycycline.  All of the patient's questions and concerns were addressed. standing without leakage of urine.  Pt to report being able to sneeze without incontinence demonstrating improved pelvic floor strength and coordination to improve confidence in social situations    Long Term Goals: 12 weeks   Pt to be discharged with home plan for carry over after discharge.    Pt to be able to perform a 20 second kegel x 10 reps to demonstrate improving strength and endurance needed for continence.  Pt to be able to delay the urge to urinate at least 10 minutes with a strong urge to urinate in order to make it to the bathroom without leaking.  Pt to report elimination of incontinence with ADLs to demonstrate improved pelvic floor muscle strength and coordination.    PLAN     Certification Period: 1/10/2020 to 4/10/2020    Outpatient Physical Therapy 1 time(s) every 1 week(s) for 12 weeks.     Physical therapy will include: therapeutic exercises, therapeutic activity, neuromuscular re-education, manual therapy, modalities PRN, patient/family education and self care/home management to achieve established goals.     At next visit: abdominal bracing, PFME to increase strength and endurance, hip assessment, bladder diary    Therapist: Mariaa Avery, PT  1/10/2020           Minocycline Pregnancy And Lactation Text: This medication is Pregnancy Category D and not consider safe during pregnancy. It is also excreted in breast milk. Azelaic Acid Counseling: Patient counseled that medicine may cause skin irritation and to avoid applying near the eyes.  In the event of skin irritation, the patient was advised to reduce the amount of the drug applied or use it less frequently.   The patient verbalized understanding of the proper use and possible adverse effects of azelaic acid.  All of the patient's questions and concerns were addressed. Erythromycin Counseling:  I discussed with the patient the risks of erythromycin including but not limited to GI upset, allergic reaction, drug rash, diarrhea, increase in liver enzymes, and yeast infections. Tazorac Pregnancy And Lactation Text: This medication is not safe during pregnancy. It is unknown if this medication is excreted in breast milk. Topical Clindamycin Pregnancy And Lactation Text: This medication is Pregnancy Category B and is considered safe during pregnancy. It is unknown if it is excreted in breast milk. Include Pregnancy/Lactation Warning?: No Bactrim Pregnancy And Lactation Text: This medication is Pregnancy Category D and is known to cause fetal risk.  It is also excreted in breast milk. Benzoyl Peroxide Counseling: Patient counseled that medicine may cause skin irritation and bleach clothing.  In the event of skin irritation, the patient was advised to reduce the amount of the drug applied or use it less frequently.   The patient verbalized understanding of the proper use and possible adverse effects of benzoyl peroxide.  All of the patient's questions and concerns were addressed. Isotretinoin Counseling: Patient should get monthly blood tests, not donate blood, not drive at night if vision affected, not share medication, and not undergo elective surgery for 6 months after tx completed. Side effects reviewed, pt to contact office should one occur. Spironolactone Pregnancy And Lactation Text: This medication can cause feminization of the male fetus and should be avoided during pregnancy. The active metabolite is also found in breast milk. Topical Retinoid counseling:  Patient advised to apply a pea-sized amount only at bedtime and wait 30 minutes after washing their face before applying.  If too drying, patient may add a non-comedogenic moisturizer. The patient verbalized understanding of the proper use and possible adverse effects of retinoids.  All of the patient's questions and concerns were addressed. Topical Sulfur Applications Pregnancy And Lactation Text: This medication is Pregnancy Category C and has an unknown safety profile during pregnancy. It is unknown if this topical medication is excreted in breast milk. Birth Control Pills Pregnancy And Lactation Text: This medication should be avoided if pregnant and for the first 30 days post-partum. Detail Level: Detailed High Dose Vitamin A Counseling: Side effects reviewed, pt to contact office should one occur. Winlevi Pregnancy And Lactation Text: This medication is considered safe during pregnancy and breastfeeding. Dapsone Pregnancy And Lactation Text: This medication is Pregnancy Category C and is not considered safe during pregnancy or breast feeding. Tazorac Counseling:  Patient advised that medication is irritating and drying.  Patient may need to apply sparingly and wash off after an hour before eventually leaving it on overnight.  The patient verbalized understanding of the proper use and possible adverse effects of tazorac.  All of the patient's questions and concerns were addressed. Azithromycin Counseling:  I discussed with the patient the risks of azithromycin including but not limited to GI upset, allergic reaction, drug rash, diarrhea, and yeast infections. Topical Clindamycin Counseling: Patient counseled that this medication may cause skin irritation or allergic reactions.  In the event of skin irritation, the patient was advised to reduce the amount of the drug applied or use it less frequently.   The patient verbalized understanding of the proper use and possible adverse effects of clindamycin.  All of the patient's questions and concerns were addressed. Doxycycline Pregnancy And Lactation Text: This medication is Pregnancy Category D and not consider safe during pregnancy. It is also excreted in breast milk but is considered safe for shorter treatment courses. Minocycline Counseling: Patient advised regarding possible photosensitivity and discoloration of the teeth, skin, lips, tongue and gums.  Patient instructed to avoid sunlight, if possible.  When exposed to sunlight, patients should wear protective clothing, sunglasses, and sunscreen.  The patient was instructed to call the office immediately if the following severe adverse effects occur:  hearing changes, easy bruising/bleeding, severe headache, or vision changes.  The patient verbalized understanding of the proper use and possible adverse effects of minocycline.  All of the patient's questions and concerns were addressed. Aklief Pregnancy And Lactation Text: It is unknown if this medication is safe to use during pregnancy.  It is unknown if this medication is excreted in breast milk.  Breastfeeding women should use the topical cream on the smallest area of the skin for the shortest time needed while breastfeeding.  Do not apply to nipple and areola. Bactrim Counseling:  I discussed with the patient the risks of sulfa antibiotics including but not limited to GI upset, allergic reaction, drug rash, diarrhea, dizziness, photosensitivity, and yeast infections.  Rarely, more serious reactions can occur including but not limited to aplastic anemia, agranulocytosis, methemoglobinemia, blood dyscrasias, liver or kidney failure, lung infiltrates or desquamative/blistering drug rashes. Sarecycline Counseling: Patient advised regarding possible photosensitivity and discoloration of the teeth, skin, lips, tongue and gums.  Patient instructed to avoid sunlight, if possible.  When exposed to sunlight, patients should wear protective clothing, sunglasses, and sunscreen.  The patient was instructed to call the office immediately if the following severe adverse effects occur:  hearing changes, easy bruising/bleeding, severe headache, or vision changes.  The patient verbalized understanding of the proper use and possible adverse effects of sarecycline.  All of the patient's questions and concerns were addressed. Azelaic Acid Pregnancy And Lactation Text: This medication is considered safe during pregnancy and breast feeding. Erythromycin Pregnancy And Lactation Text: This medication is Pregnancy Category B and is considered safe during pregnancy. It is also excreted in breast milk. Topical Sulfur Applications Counseling: Topical Sulfur Counseling: Patient counseled that this medication may cause skin irritation or allergic reactions.  In the event of skin irritation, the patient was advised to reduce the amount of the drug applied or use it less frequently.   The patient verbalized understanding of the proper use and possible adverse effects of topical sulfur application.  All of the patient's questions and concerns were addressed. Spironolactone Counseling: Patient advised regarding risks of diarrhea, abdominal pain, hyperkalemia, birth defects (for female patients), liver toxicity and renal toxicity. The patient may need blood work to monitor liver and kidney function and potassium levels while on therapy. The patient verbalized understanding of the proper use and possible adverse effects of spironolactone.  All of the patient's questions and concerns were addressed. Birth Control Pills Counseling: Birth Control Pill Counseling: I discussed with the patient the potential side effects of OCPs including but not limited to increased risk of stroke, heart attack, thrombophlebitis, deep venous thrombosis, hepatic adenomas, breast changes, GI upset, headaches, and depression.  The patient verbalized understanding of the proper use and possible adverse effects of OCPs. All of the patient's questions and concerns were addressed. Winlevi Counseling:  I discussed with the patient the risks of topical clascoterone including but not limited to erythema, scaling, itching, and stinging. Patient voiced their understanding. Isotretinoin Pregnancy And Lactation Text: This medication is Pregnancy Category X and is considered extremely dangerous during pregnancy. It is unknown if it is excreted in breast milk. Dapsone Counseling: I discussed with the patient the risks of dapsone including but not limited to hemolytic anemia, agranulocytosis, rashes, methemoglobinemia, kidney failure, peripheral neuropathy, headaches, GI upset, and liver toxicity.  Patients who start dapsone require monitoring including baseline LFTs and weekly CBCs for the first month, then every month thereafter.  The patient verbalized understanding of the proper use and possible adverse effects of dapsone.  All of the patient's questions and concerns were addressed. Benzoyl Peroxide Pregnancy And Lactation Text: This medication is Pregnancy Category C. It is unknown if benzoyl peroxide is excreted in breast milk. Tetracycline Counseling: Patient counseled regarding possible photosensitivity and increased risk for sunburn.  Patient instructed to avoid sunlight, if possible.  When exposed to sunlight, patients should wear protective clothing, sunglasses, and sunscreen.  The patient was instructed to call the office immediately if the following severe adverse effects occur:  hearing changes, easy bruising/bleeding, severe headache, or vision changes.  The patient verbalized understanding of the proper use and possible adverse effects of tetracycline.  All of the patient's questions and concerns were addressed. Patient understands to avoid pregnancy while on therapy due to potential birth defects. Topical Retinoid Pregnancy And Lactation Text: This medication is Pregnancy Category C. It is unknown if this medication is excreted in breast milk.

## 2023-05-26 ENCOUNTER — OFFICE VISIT (OUTPATIENT)
Dept: UROGYNECOLOGY | Facility: CLINIC | Age: 68
End: 2023-05-26
Payer: MEDICARE

## 2023-05-26 VITALS — SYSTOLIC BLOOD PRESSURE: 173 MMHG | HEIGHT: 72 IN | BODY MASS INDEX: 43.06 KG/M2 | DIASTOLIC BLOOD PRESSURE: 74 MMHG

## 2023-05-26 DIAGNOSIS — N39.46 MIXED STRESS AND URGE URINARY INCONTINENCE: Primary | ICD-10-CM

## 2023-05-26 DIAGNOSIS — N95.2 VAGINAL ATROPHY: ICD-10-CM

## 2023-05-26 DIAGNOSIS — N81.6 RECTOCELE, FEMALE: ICD-10-CM

## 2023-05-26 DIAGNOSIS — N81.11 CYSTOCELE, MIDLINE: ICD-10-CM

## 2023-05-26 PROCEDURE — 99999 PR PBB SHADOW E&M-EST. PATIENT-LVL III: ICD-10-PCS | Mod: PBBFAC,,, | Performed by: NURSE PRACTITIONER

## 2023-05-26 PROCEDURE — 99999 PR PBB SHADOW E&M-EST. PATIENT-LVL III: CPT | Mod: PBBFAC,,, | Performed by: NURSE PRACTITIONER

## 2023-05-26 PROCEDURE — 99213 PR OFFICE/OUTPT VISIT, EST, LEVL III, 20-29 MIN: ICD-10-PCS | Mod: S$PBB,,, | Performed by: NURSE PRACTITIONER

## 2023-05-26 PROCEDURE — 99213 OFFICE O/P EST LOW 20 MIN: CPT | Mod: PBBFAC | Performed by: NURSE PRACTITIONER

## 2023-05-26 PROCEDURE — 99213 OFFICE O/P EST LOW 20 MIN: CPT | Mod: S$PBB,,, | Performed by: NURSE PRACTITIONER

## 2023-05-26 RX ORDER — ACETAMINOPHEN 500 MG
TABLET ORAL
COMMUNITY
Start: 2023-01-01

## 2023-05-26 RX ORDER — METFORMIN HYDROCHLORIDE 1000 MG/1
1000 TABLET ORAL 2 TIMES DAILY
COMMUNITY
Start: 2023-04-27 | End: 2023-09-21

## 2023-05-26 RX ORDER — SPIRONOLACTONE 50 MG/1
50 TABLET, FILM COATED ORAL NIGHTLY
COMMUNITY
Start: 2023-03-14 | End: 2023-09-21

## 2023-05-26 RX ORDER — CLINDAMYCIN PHOSPHATE 10 UG/ML
LOTION TOPICAL DAILY PRN
COMMUNITY
Start: 2023-04-20 | End: 2023-09-21

## 2023-05-26 RX ORDER — ASCORBIC ACID 500 MG
TABLET,CHEWABLE ORAL
COMMUNITY
Start: 2023-01-01

## 2023-05-26 NOTE — PATIENT INSTRUCTIONS
1)  Vulvar irritation:  --resolved at this time  --nystatin cream twice daily as needed     2)  Mixed urinary incontinence, urge < stress:    --continue to control diabetes: A1c 5.5%  --continue to work on weight loss  --Empty bladder every 2-3 hours.  Empty well: wait a minute, lean forward on toilet.    --Avoid dietary irritants (see sheet).  Keep diary x 3-5 days to determine your irritants.  --could not start pelvic floor PT. [facility] location is closed. Has cataracts and trouble driving.   --Hold off for now  --URGE: continue mirabegron to 50 mg daily  ---STRESS:  Pessary vs. Sling                      --need recheck for anterior prolapse before any surgery            3) cystocele/ rectocele  --for now continue #3 ring with support pessary  --consider surgical correction (A&P repair if possible since uterus seems supported) in future   --if uterus not well-supported, could do TVH--high risk if need to convert to LSC or open   --need to repeat pap/HPV in 12/2023    --if still +, could we do LEEP instead? (Will ask ONC)   --need to do office cystometry to see if need sling    --make sure A1c <7.5%   --need to get clearance per PCP + labs (A1c, CBC, CMP, T&S)/EKG    4) well-woman:  --10/2022 pap normal/HPV 16+  --12/2022 colposcopy  Colposcopy Site:  Cervix  Position:  Supine  Acrowhite Lesion: No    Atypical Vessels: No    Transformation Zone Adequate?: No    Biopsy?: Yes         Location:  Cervix ((6 00))  ECC Performed?: Yes (tenaculum used to hold cervix still)    LEEP Performed?: No    Estimated blood loss (cc):  0  1. CERVICAL BIOPSY AT 6 O'CLOCK SHOWS SMALL DETACHED FRAGMENTS OF SQUAMOUS   EPITHELIUM WITH MILD DYSPLASIA (CIN1) AND INFLAMMATION.   2. ENDOCERVICAL CURETTAGE SHOWS DETACHED FRAGMENTS OF SQUAMOUS EPITHELIUM   MILD DYSPLASIA AND KOILOCYTOTIC ATYPIA (CIN1).   --continue to control diabetes  --repeat pap/HPV 9/2023     4) RTC for pessary check in 3 months--will do pap at that visit and plan on colpo  in 12/2023.

## 2023-05-26 NOTE — PROGRESS NOTES
Urogyn follow up  05/26/2023  .  [facility] - UR[facility]YN[facility]OLOG[facility]  [address] [id number]    Sandi Dobson  [id number]  1955      Sandi Dobson is a 68 y.o. here for a urogyn follow up of mixed incontinence and vulvar irritation.      Last HPI from 10/20/2022     1)  UI:  (+) RUDY (bend, cough, sneeze) = (+) UUI  X years, worsening. Taking mirabegron 25 mg -- unsure if helps. Tried PFPT ([place]) in 2020.    (--) pads--should be. Sleeps in lazy boy recliner, has a dog pad underneath her--has a few drops of urine. Also has pad in wheelchair. Leaks with transferring.  Daytime frequency: Q 2 hours. Tries to hold at least that.   Nocturia: Yes: 1/night.   (--) dysuria,  (--) hematuria,  (+) reported frequent UTIs. Sees PCP--sends in ABX/pyridium without testing. Not sure C&S are sent.  (--) complete bladder emptying. +PV urgency/hesitancy. DV with small 2nd volume.      --9/2022 [facility] [place]  presents for follow up with acute vulvitis, stress urinary incontinence and overactive bladder.. Patient is being currently treated for chronic and acute vulvitis and vulvar pruritus. She has been using RO soaks and topical steroids. She noticed only mild improvement and continues to have itching and scratching.    And also presents for follow-up of her urodynamics performed on August 22nd. The study revealed a normally sensate bladder of normal capacity and compliance. She did exhibit stress urinary incontinence at a fill of 392 cc with Valsalva leak point pressures of 27 and 34. She voids by detrusor contraction to completion with a postvoid residual of 60 cc. The bladder was stable throughout there was no evidence of DO or DI.      1. Overactive bladder  Reviewed urodynamics study with the patient which failed to reveal detrusor overactivity or detrusor overactivity incontinence.    2. 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 We discussed treatment options including possible mid urethral sling with bulking or staged approach with bulking later. Reviewed the nature and technique of the procedure, recovery, and potential risks and complications. Recommend a staged approach to avoid urinary retention. Patient is given literature on mid urethral sling and on bulking procedure.    3. Vulvar pruritus  Patient will continue to use the birth soaks in the topical hydrocortisone as well as her Vaseline mildly it barrier an effort to remove the condition of the skin of the vulva perianal area and upper thighs. We did discuss with her avoiding scratching the areas I will only incur further itching. She is also using some cold packs to help reduce the sensation. She is return to the office in 1 month to recheck vulva and we consider her treatment options for stress urinary incontinence.     2)  POP:  Absent.  (--) vaginal bleeding. (--) vaginal discharge. (--) sexually active.  (--) dyspareunia.  (+)  Vaginal dryness.  (--) vaginal estrogen use.   --POP-Q:  Aa -1; Ba -1; C -5; Ap -1; Bp -; D -6.  Genital hiatus 4, perineal body 2, total vaginal length 10-11.    3)  BM:  (--) constipation/straining.  (--) chronic diarrhea. (--) hematochezia.  (--) fecal incontinence.  (--) fecal smearing/urgency.  (+) complete evacuation.      4)  Vulvar irritation:  --using hydrocortisone cream daily  --scratches a lot  --uses ice packs  --using domeboro (aluminum and calcium) solution to soak  --has tried vaseline and EVVO  --thinks this is from staying wet with urine    Changes from last visit:  1)  Mixed urinary incontinence, urge < stress:    --denies UI  --voiding every 6 hours  --voiding 1/ night  --taking mirabegron    2)vaginal atrophy  -- using vaginal estrogen cream twice weekly     3)midline cystocele/ rectocele  --using pessary  --denies pain, bleeding, or  discharge    4)vulvar itching  -- 12/2021 hgba1c 7     Past Medical History:   Diagnosis Date    History of colonic polyps 01/29/2021 11/19 [facility] - Findings: 2 sessile polyps hepatic flexure and rectosigmoid      Hypertension     Mood disorder 01/29/2021    Uncontrolled type 2 diabetes mellitus with hyperglycemia 01/29/2021   --B knee OA: uses wheelchair; has trouble straightening knees; is not able to walk much at all     --mood disorder: h/o situational depression (mother was killed by drunk )--resolved; followed by psych (Radha); takes effexor XR; currently stable     --DM2:    Lab Results   Component Value Date    HGBA1C 7.0 (H) 12/30/2022   --has been as high as 13%+ (could not have cataract surgery 8/2022)--now 7 on oral agents; no secondary disease; ? Mild B foot neuropathy    History reviewed. No pertinent surgical history.  Xlap/Pfannenstiel for removal of benign/congenital tumor--L fallopian tube was looped around thisc    Family History   Problem Relation Age of Onset    Vision loss Mother     Diabetes Father     Cancer Father     Hearing loss Maternal Aunt     Hearing loss Maternal Grandmother     Stroke Paternal Grandmother        Social History     Socioeconomic History    Marital status:    Tobacco Use    Smoking status: Never    Smokeless tobacco: Never   Substance and Sexual Activity    Drug use: Not Currently       Current Outpatient Medications   Medication Sig Dispense Refill    ascorbic acid, vitamin C, 500 mg Chew       betamethasone valerate (LUXIQ) 0.12 % foam Apply topically.      cholecalciferol, vitamin D3, (VITAMIN D3) 50 mcg (2,000 unit) Cap capsule       clindamycin (CLEOCIN T) 1 % lotion Apply topically daily as needed.      desonide (DESOWEN) 0.05 % cream SMARTSIG:Topical 3-4 Times Daily PRN      estradioL (ESTRACE) 0.01 % (0.1 mg/gram) vaginal cream dime-sized amount with finger in vagina/around external parts nightly. 42.5 g 11    ferrous fumarate-vitamin C 200 mg  (65 mg iron)-25 mg TbSR Take by mouth.      glipiZIDE (GLUCOTROL) 10 MG tablet Take 1 tablet (10 mg total) by mouth 2 (two) times daily with meals. 180 tablet 12    ketoconazole (NIZORAL) 2 % cream Apply topically 2 (two) times daily as needed.      ketoconazole (NIZORAL) 2 % shampoo Apply topically twice a week.      metFORMIN (GLUCOPHAGE) 1000 MG tablet Take 1,000 mg by mouth 2 (two) times daily.      metFORMIN (GLUCOPHAGE) 500 MG tablet 2 pills BID2 pills  tablet 12    mirabegron (MYRBETRIQ) 50 mg Tb24 Take 1 tablet (50 mg total) by mouth once daily. 90 tablet 4    pioglitazone (ACTOS) 45 MG tablet Take 1 tablet (45 mg total) by mouth once daily. 90 tablet 4    spironolactone (ALDACTONE) 50 MG tablet Take 50 mg by mouth every evening.      venlafaxine (EFFEXOR-XR) 150 MG Cp24 Take  3 at night 270 capsule 12    omeprazole (PRILOSEC) 10 MG capsule Take 2 capsules (20 mg total) by mouth 2 (two) times daily before meals. 120 capsule 11     No current facility-administered medications for this visit.       Review of patient's allergies indicates:  No Known Allergies    Well woman:  Pap test: unknown.  History of abnormal paps: No.  History of STIs:  No  Mammogram: needs to schedule  Colonoscopy: Date of last: 2019.  Result:  Findings: 2 sessile polyps hepatic flexure and rectosigmoid.  Repeat due:  2029.    DEXA:  needs to schedule    ROS:  As per HPI.      Exam  BP (!) 173/74 (BP Location: Left arm, Patient Position: Sitting, BP Method: Medium (Automatic)) Comment: 164/80 Comment (BP Location): R-arm Comment (BP Method): Large  Ht 6' (1.829 m)   LMP  (LMP Unknown)   BMI 43.06 kg/m²   General: alert and oriented, no acute distress  Respiratory: normal respiratory effort  Abd: soft, non-tender, non-distended; +rectus diastasis, +umbilical hernia      Pelvic  Ext. Genitalia: normal external genitalia. Normal bartholin's and skeens glands.    Vagina: + atrophy. Normal vaginal mucosa without lesions. No  discharge noted.   Non-tender bladder base without palpable mass.  #3 ring with support pessary in place  Cervix: no lesions  Uterus:  uterus is normal size, shape, consistency and nontender   Urethra: no masses or tenderness  Urethral meatus: no lesions, caruncle or prolapse.      Pessary removed without difficulty. Washed with soap and water. Reinserted without difficulty.     Impression  1. Mixed stress and urge urinary incontinence        2. Cystocele, midline        3. Rectocele, female        4. Vaginal atrophy              We reviewed the above issues and discussed options for short-term versus long-term management of her problems.   Plan:   1)  Vulvar irritation:  --resolved at this time  --nystatin cream twice daily as needed     2)  Mixed urinary incontinence, urge < stress:    --continue to control diabetes: A1c 5.5%  --continue to work on weight loss  --Empty bladder every 2-3 hours.  Empty well: wait a minute, lean forward on toilet.    --Avoid dietary irritants (see sheet).  Keep diary x 3-5 days to determine your irritants.  --could not start pelvic floor PT. [facility] location [facility] closed. Has cataracts and trouble driving.   --Hold off for now  --URGE: continue mirabegron to 50 mg daily  ---STRESS:  Pessary vs. Sling                      --need recheck for anterior prolapse before any surgery            3) cystocele/ rectocele  --for now continue #3 ring with support pessary  --consider surgical correction (A&P repair if possible since uterus seems supported) in future   --if uterus not well-supported, could do TVH--high risk if need to convert to LSC or open   --need to repeat pap/HPV in 12/2023    --if still +, could we do LEEP instead? (Will ask ONC)   --need to do office cystometry to see if need sling    --make sure A1c <7.5%   --need to get clearance per PCP + labs (A1c, CBC, CMP, T&S)/EKG    4) well-woman:  --10/2022 pap normal/HPV 16+  --12/2022 colposcopy  Colposcopy Site:  Cervix  Position:   Supine  Acrowhite Lesion: No    Atypical Vessels: No    Transformation Zone Adequate?: No    Biopsy?: Yes         Location:  Cervix ((6 00))  ECC Performed?: Yes (tenaculum used to hold cervix still)    LEEP Performed?: No    Estimated blood loss (cc):  0  1. CERVICAL BIOPSY AT 6 O'CLOCK SHOWS SMALL DETACHED FRAGMENTS OF SQUAMOUS   EPITHELIUM WITH MILD DYSPLASIA (CIN1) AND INFLAMMATION.   2. ENDOCERVICAL CURETTAGE SHOWS DETACHED FRAGMENTS OF SQUAMOUS EPITHELIUM   MILD DYSPLASIA AND KOILOCYTOTIC ATYPIA (CIN1).   --continue to control diabetes  --repeat pap/HPV 9/2023     4) RTC for pessary check in 3 months--will do pap at that visit and plan on colpo in 12/2023.     I spent a total of 20 minutes on the day of the visit.  This includes face to face time and non-face to face time preparing to see the patient (eg, review of tests), obtaining and/or reviewing separately obtained history, documenting clinical information in the electronic or other health record, independently interpreting results and communicating results to the patient/family/caregiver, or care coordinator.     [facility]  Division of Female Pelvic Medicine and Reconstructive Surgery  Department of Obstetrics & Gynecology

## 2023-06-21 LAB — HBA1C MFR BLD: 5.4 % (ref 4–6)

## 2023-07-27 DIAGNOSIS — E11.9 TYPE 2 DIABETES MELLITUS WITHOUT COMPLICATION: ICD-10-CM

## 2023-07-31 ENCOUNTER — PATIENT MESSAGE (OUTPATIENT)
Dept: ADMINISTRATIVE | Facility: HOSPITAL | Age: 68
End: 2023-07-31
Payer: MEDICARE

## 2023-08-14 ENCOUNTER — PATIENT MESSAGE (OUTPATIENT)
Dept: ADMINISTRATIVE | Facility: HOSPITAL | Age: 68
End: 2023-08-14
Payer: MEDICARE

## 2023-08-15 ENCOUNTER — PATIENT OUTREACH (OUTPATIENT)
Dept: ADMINISTRATIVE | Facility: HOSPITAL | Age: 68
End: 2023-08-15
Payer: MEDICARE

## 2023-08-15 DIAGNOSIS — Z11.59 NEED FOR HEPATITIS C SCREENING TEST: Primary | ICD-10-CM

## 2023-08-23 ENCOUNTER — PATIENT OUTREACH (OUTPATIENT)
Dept: ADMINISTRATIVE | Facility: HOSPITAL | Age: 68
End: 2023-08-23
Payer: MEDICARE

## 2023-09-21 ENCOUNTER — OFFICE VISIT (OUTPATIENT)
Dept: UROGYNECOLOGY | Facility: CLINIC | Age: 68
End: 2023-09-21
Payer: MEDICARE

## 2023-09-21 VITALS — HEIGHT: 72 IN | DIASTOLIC BLOOD PRESSURE: 70 MMHG | BODY MASS INDEX: 43.06 KG/M2 | SYSTOLIC BLOOD PRESSURE: 122 MMHG

## 2023-09-21 DIAGNOSIS — N39.46 MIXED STRESS AND URGE URINARY INCONTINENCE: ICD-10-CM

## 2023-09-21 DIAGNOSIS — Z12.4 ENCOUNTER FOR SCREENING FOR CERVICAL CANCER: ICD-10-CM

## 2023-09-21 DIAGNOSIS — N95.2 VAGINAL ATROPHY: ICD-10-CM

## 2023-09-21 DIAGNOSIS — Z46.89 PESSARY MAINTENANCE: Primary | ICD-10-CM

## 2023-09-21 DIAGNOSIS — N81.6 RECTOCELE, FEMALE: ICD-10-CM

## 2023-09-21 DIAGNOSIS — N81.11 CYSTOCELE, MIDLINE: ICD-10-CM

## 2023-09-21 PROCEDURE — 87624 HPV HI-RISK TYP POOLED RSLT: CPT | Performed by: NURSE PRACTITIONER

## 2023-09-21 PROCEDURE — 99999 PR PBB SHADOW E&M-EST. PATIENT-LVL IV: CPT | Mod: PBBFAC,,, | Performed by: NURSE PRACTITIONER

## 2023-09-21 PROCEDURE — 99213 OFFICE O/P EST LOW 20 MIN: CPT | Mod: S$PBB,,, | Performed by: NURSE PRACTITIONER

## 2023-09-21 PROCEDURE — 88141 CYTOPATH C/V INTERPRET: CPT | Mod: ,,, | Performed by: PATHOLOGY

## 2023-09-21 PROCEDURE — 88141 PR  CYTOPATH CERV/VAG INTERPRET: ICD-10-PCS | Mod: ,,, | Performed by: PATHOLOGY

## 2023-09-21 PROCEDURE — 99213 PR OFFICE/OUTPT VISIT, EST, LEVL III, 20-29 MIN: ICD-10-PCS | Mod: S$PBB,,, | Performed by: NURSE PRACTITIONER

## 2023-09-21 PROCEDURE — 88175 CYTOPATH C/V AUTO FLUID REDO: CPT | Performed by: PATHOLOGY

## 2023-09-21 PROCEDURE — 99999 PR PBB SHADOW E&M-EST. PATIENT-LVL IV: ICD-10-PCS | Mod: PBBFAC,,, | Performed by: NURSE PRACTITIONER

## 2023-09-21 PROCEDURE — 99214 OFFICE O/P EST MOD 30 MIN: CPT | Mod: PBBFAC | Performed by: NURSE PRACTITIONER

## 2023-09-21 RX ORDER — IVERMECTIN 10 MG/G
CREAM TOPICAL
COMMUNITY
Start: 2023-08-02

## 2023-09-21 RX ORDER — COVID-19 ANTIGEN TEST
KIT MISCELLANEOUS
COMMUNITY
Start: 2023-09-08

## 2023-09-21 NOTE — PROGRESS NOTES
Urogyn follow up  09/21/2023  .  [facility] - UROGYNECOLOGY  [address] [address]    Sandi Dobson  [id number]  1955      Sandi Dobson is a 68 y.o. here for a urogyn follow up of mixed incontinence and vulvar irritation.      Last HPI from 10/20/2022     1)  UI:  (+) RUDY (bend, cough, sneeze) = (+) UUI  X years, worsening. Taking mirabegron 25 mg -- unsure if helps. Tried PFPT ([place]) in 2020.    (--) pads--should be. Sleeps in lazy boy recliner, has a dog pad underneath her--has a few drops of urine. Also has pad in wheelchair. Leaks with transferring.  Daytime frequency: Q 2 hours. Tries to hold at least that.   Nocturia: Yes: 1/night.   (--) dysuria,  (--) hematuria,  (+) reported frequent UTIs. Sees PCP--sends in ABX/pyridium without testing. Not sure C&S are sent.  (--) complete bladder emptying. +PV urgency/hesitancy. DV with small 2nd volume.      [place] [facility] [place]  presents for follow up with acute vulvitis, stress urinary incontinence and overactive bladder.. Patient is being currently treated for chronic and acute vulvitis and vulvar pruritus. She has been using RO soaks and topical steroids. She noticed only mild improvement and continues to have itching and scratching.    And also presents for follow-up of her urodynamics performed on August 22nd. The study revealed a normally sensate bladder of normal capacity and compliance. She did exhibit stress urinary incontinence at a fill of 392 cc with Valsalva leak point pressures of 27 and 34. She voids by detrusor contraction to completion with a postvoid residual of 60 cc. The bladder was stable throughout there was no evidence of DO or DI.      1. Overactive bladder  Reviewed urodynamics study with the patient which failed to reveal detrusor overactivity or detrusor overactivity incontinence.    2. 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 We discussed treatment options including possible mid urethral sling with bulking or staged approach with bulking later. Reviewed the nature and technique of the procedure, recovery, and potential risks and complications. Recommend a staged approach to avoid urinary retention. Patient is given literature on mid urethral sling and on bulking procedure.    3. Vulvar pruritus  Patient will continue to use the birth soaks in the topical hydrocortisone as well as her Vaseline mildly it barrier an effort to remove the condition of the skin of the vulva perianal area and upper thighs. We did discuss with her avoiding scratching the areas I will only incur further itching. She is also using some cold packs to help reduce the sensation. She is return to the office in 1 month to recheck vulva and we consider her treatment options for stress urinary incontinence.     2)  POP:  Absent.  (--) vaginal bleeding. (--) vaginal discharge. (--) sexually active.  (--) dyspareunia.  (+)  Vaginal dryness.  (--) vaginal estrogen use.   --POP-Q:  Aa -1; Ba -1; C -5; Ap -1; Bp -; D -6.  Genital hiatus 4, perineal body 2, total vaginal length 10-11.    3)  BM:  (--) constipation/straining.  (--) chronic diarrhea. (--) hematochezia.  (--) fecal incontinence.  (--) fecal smearing/urgency.  (+) complete evacuation.      4)  Vulvar irritation:  --using hydrocortisone cream daily  --scratches a lot  --uses ice packs  --using domeboro (aluminum and calcium) solution to soak  --has tried vaseline and EVVO  --thinks this is from staying wet with urine    05/26/2023  1)  Mixed urinary incontinence, urge < stress:    --denies UI  --voiding every 6 hours  --voiding 1/ night  --taking mirabegron    2)vaginal atrophy  -- using vaginal estrogen cream twice weekly     3)midline cystocele/ rectocele  --using pessary  --denies pain, bleeding, or  discharge    4)vulvar itching  -- 12/2021 hgba1c 7    Changes since last visit  Taking myrbetriq  Has had more UI over the past few weeks  Using vaginal estrogen cream       Past Medical History:   Diagnosis Date    History of colonic polyps 01/29/2021 11/19 [facility] - Findings: 2 sessile polyps hepatic flexure and rectosigmoid      Hypertension     Mood disorder 01/29/2021    Uncontrolled type 2 diabetes mellitus with hyperglycemia 01/29/2021   --B knee OA: uses wheelchair; has trouble straightening knees; is not able to walk much at all     --mood disorder: h/o situational depression (mother was killed by drunk )--resolved; followed by psych (Radha); takes effexor XR; currently stable     --DM2:    Lab Results   Component Value Date    HGBA1C 5.4 06/21/2023   --has been as high as 13%+ (could not have cataract surgery 8/2022)--now 7 on oral agents; no secondary disease; ? Mild B foot neuropathy    History reviewed. No pertinent surgical history.  Xlap/Pfannenstiel for removal of benign/congenital tumor--L fallopian tube was looped around thisc    Family History   Problem Relation Age of Onset    Vision loss Mother     Diabetes Father     Cancer Father     Hearing loss Maternal Aunt     Hearing loss Maternal Grandmother     Stroke Paternal Grandmother        Social History     Socioeconomic History    Marital status:    Tobacco Use    Smoking status: Never    Smokeless tobacco: Never   Substance and Sexual Activity    Drug use: Not Currently       Current Outpatient Medications   Medication Sig Dispense Refill    ascorbic acid, vitamin C, 500 mg Chew       betamethasone valerate (LUXIQ) 0.12 % foam Apply topically.      cholecalciferol, vitamin D3, (VITAMIN D3) 50 mcg (2,000 unit) Cap capsule       desonide (DESOWEN) 0.05 % cream SMARTSIG:Topical 3-4 Times Daily PRN      estradioL (ESTRACE) 0.01 % (0.1 mg/gram) vaginal cream dime-sized amount with finger in vagina/around external parts nightly. 42.5 g  11    ferrous fumarate-vitamin C 200 mg (65 mg iron)-25 mg TbSR Take by mouth.      FLOWFLEX COVID-19 AG HOME TEST Kit Use as Directed on the Package      glipiZIDE (GLUCOTROL) 10 MG tablet Take 1 tablet (10 mg total) by mouth 2 (two) times daily with meals. 180 tablet 12    ivermectin (SOOLANTRA) 1 % Crea Apply topically.      metFORMIN (GLUCOPHAGE) 500 MG tablet 2 pills BID2 pills  tablet 12    mirabegron (MYRBETRIQ) 50 mg Tb24 Take 1 tablet (50 mg total) by mouth once daily. 90 tablet 4    pioglitazone (ACTOS) 45 MG tablet Take 1 tablet (45 mg total) by mouth once daily. 90 tablet 4    venlafaxine (EFFEXOR-XR) 150 MG Cp24 Take  3 at night 270 capsule 12    ketoconazole (NIZORAL) 2 % cream Apply topically 2 (two) times daily as needed.      ketoconazole (NIZORAL) 2 % shampoo Apply topically twice a week.      omeprazole (PRILOSEC) 10 MG capsule Take 2 capsules (20 mg total) by mouth 2 (two) times daily before meals. 120 capsule 11     No current facility-administered medications for this visit.       Review of patient's allergies indicates:  No Known Allergies    Well woman:  Pap test: unknown.  History of abnormal paps: No.  History of STIs:  No  Mammogram: needs to schedule  Colonoscopy: Date of last: 2019.  Result:  Findings: 2 sessile polyps hepatic flexure and rectosigmoid.  Repeat due:  2029.    DEXA:  needs to schedule    ROS:  As per HPI.      Exam  /70 (BP Location: Left arm, Patient Position: Sitting, BP Method: Large (Manual))   Ht 6' (1.829 m)   LMP  (LMP Unknown)   BMI 43.06 kg/m²   General: alert and oriented, no acute distress  Respiratory: normal respiratory effort  Abd: soft, non-tender, non-distended; +rectus diastasis, +umbilical hernia      Pelvic  Ext. Genitalia: normal external genitalia. Normal bartholin's and skeens glands.    Vagina: + atrophy. Normal vaginal mucosa without lesions. No discharge noted.   Non-tender bladder base without palpable mass.  #3 ring with support  pessary in place  Cervix: no lesions  Uterus:  uterus is normal size, shape, consistency and nontender   Urethra: no masses or tenderness  Urethral meatus: no lesions, caruncle or prolapse.      Pessary removed without difficulty. Washed with soap and water. Reinserted without difficulty.     Impression  1. Pessary maintenance        2. Mixed stress and urge urinary incontinence        3. Cystocele, midline        4. Rectocele, female        5. Vaginal atrophy        6. Encounter for screening for cervical cancer  HPV High Risk Genotypes, PCR    Liquid-Based Pap Smear, Screening              We reviewed the above issues and discussed options for short-term versus long-term management of her problems.   Plan:   1)  Vulvar irritation:  --resolved at this time  --nystatin cream twice daily as needed     2)  Mixed urinary incontinence, urge < stress:    --continue to control diabetes: A1c 5.5%  --continue to work on weight loss  --Empty bladder every 2-3 hours.  Empty well: wait a minute, lean forward on toilet.    --Avoid dietary irritants (see sheet).  Keep diary x 3-5 days to determine your irritants.  --could not start pelvic floor PT. [facility] location is closed. Has cataracts and trouble driving.   --Hold off for now  --URGE: continue mirabegron to 50 mg daily  ---STRESS:  Pessary vs. Sling                      --need recheck for anterior prolapse before any surgery            3) cystocele/ rectocele  --for now continue #3 ring with support pessary  --consider surgical correction (A&P repair if possible since uterus seems supported) in future   --if uterus not well-supported, could do TVH--high risk if need to convert to LSC or open   --need to repeat pap/HPV in 12/2023    --if still +, could we do LEEP instead? (Will ask ONC)   --need to do office cystometry to see if need sling    --make sure A1c <7.5%   --need to get clearance per PCP + labs (A1c, CBC, CMP, T&S)/EKG    4) well-woman:  --10/2022 pap normal/HPV  16+  --12/2022 colposcopy  Colposcopy Site:  Cervix  Position:  Supine  Acrowhite Lesion: No    Atypical Vessels: No    Transformation Zone Adequate?: No    Biopsy?: Yes         Location:  Cervix ((6 00))  ECC Performed?: Yes (tenaculum used to hold cervix still)    LEEP Performed?: No    Estimated blood loss (cc):  0  1. CERVICAL BIOPSY AT 6 O'CLOCK SHOWS SMALL DETACHED FRAGMENTS OF SQUAMOUS   EPITHELIUM WITH MILD DYSPLASIA (CIN1) AND INFLAMMATION.   2. ENDOCERVICAL CURETTAGE SHOWS DETACHED FRAGMENTS OF SQUAMOUS EPITHELIUM   MILD DYSPLASIA AND KOILOCYTOTIC ATYPIA (CIN1).   --continue to control diabetes  --repeat pap/HPV today     4) RTC for pessary check in 3 months--will do colpo at that time if needed    I spent a total of 20 minutes on the day of the visit.  This includes face to face time and non-face to face time preparing to see the patient (eg, review of tests), obtaining and/or reviewing separately obtained history, documenting clinical information in the electronic or other health record, independently interpreting results and communicating results to the patient/family/caregiver, or care coordinator.     [facility]  Division of Female Pelvic Medicine and Reconstructive Surgery  Department of Obstetrics & Gynecology

## 2023-09-21 NOTE — PATIENT INSTRUCTIONS
1)  Vulvar irritation:  --resolved at this time  --nystatin cream twice daily as needed     2)  Mixed urinary incontinence, urge < stress:    --continue to control diabetes: A1c 5.5%  --continue to work on weight loss  --Empty bladder every 2-3 hours.  Empty well: wait a minute, lean forward on toilet.    --Avoid dietary irritants (see sheet).  Keep diary x 3-5 days to determine your irritants.  --could not start pelvic floor PT. [facility] location is closed. Has cataracts and trouble driving.   --Hold off for now  --URGE: continue mirabegron to 50 mg daily  ---STRESS:  Pessary vs. Sling                      --need recheck for anterior prolapse before any surgery            3) cystocele/ rectocele  --for now continue #3 ring with support pessary  --consider surgical correction (A&P repair if possible since uterus seems supported) in future   --if uterus not well-supported, could do TVH--high risk if need to convert to LSC or open   --need to repeat pap/HPV in 12/2023    --if still +, could we do LEEP instead? (Will ask ONC)   --need to do office cystometry to see if need sling    --make sure A1c <7.5%   --need to get clearance per PCP + labs (A1c, CBC, CMP, T&S)/EKG    4) well-woman:  --10/2022 pap normal/HPV 16+  --12/2022 colposcopy  Colposcopy Site:  Cervix  Position:  Supine  Acrowhite Lesion: No    Atypical Vessels: No    Transformation Zone Adequate?: No    Biopsy?: Yes         Location:  Cervix ((6 00))  ECC Performed?: Yes (tenaculum used to hold cervix still)    LEEP Performed?: No    Estimated blood loss (cc):  0  1. CERVICAL BIOPSY AT 6 O'CLOCK SHOWS SMALL DETACHED FRAGMENTS OF SQUAMOUS   EPITHELIUM WITH MILD DYSPLASIA (CIN1) AND INFLAMMATION.   2. ENDOCERVICAL CURETTAGE SHOWS DETACHED FRAGMENTS OF SQUAMOUS EPITHELIUM   MILD DYSPLASIA AND KOILOCYTOTIC ATYPIA (CIN1).   --continue to control diabetes  --repeat pap/HPV today     4) RTC for pessary check in 3 months--will do colpo at that time if needed

## 2023-09-28 LAB
FINAL PATHOLOGIC DIAGNOSIS: NORMAL
HPV HR 12 DNA SPEC QL NAA+PROBE: NEGATIVE
HPV16 AG SPEC QL: NEGATIVE
HPV18 DNA SPEC QL NAA+PROBE: NEGATIVE
Lab: NORMAL

## 2023-10-26 DIAGNOSIS — L29.2 VULVAR ITCHING: ICD-10-CM

## 2023-10-26 DIAGNOSIS — N95.2 VAGINAL ATROPHY: ICD-10-CM

## 2023-10-26 RX ORDER — ESTRADIOL 0.1 MG/G
CREAM VAGINAL
Qty: 42.5 G | Refills: 0 | Status: SHIPPED | OUTPATIENT
Start: 2023-10-26 | End: 2023-12-26 | Stop reason: SDUPTHER

## 2023-10-26 NOTE — TELEPHONE ENCOUNTER
Refill Decision Note   Sandi Dobson  is requesting a refill authorization.  Brief Assessment and Rationale for Refill:  Approve     Medication Therapy Plan:         Comments:     Note composed:9:22 AM 10/26/2023

## 2023-12-26 ENCOUNTER — OFFICE VISIT (OUTPATIENT)
Dept: UROGYNECOLOGY | Facility: CLINIC | Age: 68
End: 2023-12-26
Payer: MEDICARE

## 2023-12-26 VITALS
SYSTOLIC BLOOD PRESSURE: 135 MMHG | BODY MASS INDEX: 43.06 KG/M2 | DIASTOLIC BLOOD PRESSURE: 65 MMHG | HEIGHT: 72 IN | HEART RATE: 80 BPM

## 2023-12-26 DIAGNOSIS — N95.2 VAGINAL ATROPHY: ICD-10-CM

## 2023-12-26 DIAGNOSIS — N81.6 RECTOCELE, FEMALE: ICD-10-CM

## 2023-12-26 DIAGNOSIS — N81.11 CYSTOCELE, MIDLINE: Primary | ICD-10-CM

## 2023-12-26 DIAGNOSIS — N39.46 URINARY INCONTINENCE, MIXED: ICD-10-CM

## 2023-12-26 DIAGNOSIS — Z46.89 PESSARY MAINTENANCE: ICD-10-CM

## 2023-12-26 PROCEDURE — 99999 PR PBB SHADOW E&M-EST. PATIENT-LVL IV: CPT | Mod: PBBFAC,,, | Performed by: NURSE PRACTITIONER

## 2023-12-26 PROCEDURE — 99999 PR PBB SHADOW E&M-EST. PATIENT-LVL IV: ICD-10-PCS | Mod: PBBFAC,,, | Performed by: NURSE PRACTITIONER

## 2023-12-26 PROCEDURE — 99213 PR OFFICE/OUTPT VISIT, EST, LEVL III, 20-29 MIN: ICD-10-PCS | Mod: S$PBB,,, | Performed by: NURSE PRACTITIONER

## 2023-12-26 PROCEDURE — 99214 OFFICE O/P EST MOD 30 MIN: CPT | Mod: PBBFAC | Performed by: NURSE PRACTITIONER

## 2023-12-26 PROCEDURE — 99213 OFFICE O/P EST LOW 20 MIN: CPT | Mod: S$PBB,,, | Performed by: NURSE PRACTITIONER

## 2023-12-26 RX ORDER — ESTRADIOL 0.1 MG/G
1 CREAM VAGINAL
Qty: 42.5 G | Refills: 0 | Status: SHIPPED | OUTPATIENT
Start: 2023-12-28 | End: 2023-12-28

## 2023-12-26 NOTE — PROGRESS NOTES
Urogyn follow up  12/26/2023  .  [facility], [place], operated by [facility] - UROGYNECOLOGY  [address] [id number]    Sandi Dobson  [id number]  1955      Sandi Dobson is a 68 y.o. here for a urogyn follow up of mixed incontinence and pessary check     Last HPI from 10/20/2022     1)  UI:  (+) RUDY (bend, cough, sneeze) = (+) UUI  X years, worsening. Taking mirabegron 25 mg -- unsure if helps. Tried PFPT ([place]) in 2020.    (--) pads--should be. Sleeps in lazy boy recliner, has a dog pad underneath her--has a few drops of urine. Also has pad in wheelchair. Leaks with transferring.  Daytime frequency: Q 2 hours. Tries to hold at least that.   Nocturia: Yes: 1/night.   (--) dysuria,  (--) hematuria,  (+) reported frequent UTIs. Sees PCP--sends in ABX/pyridium without testing. Not sure C&S are sent.  (--) complete bladder emptying. +PV urgency/hesitancy. DV with small 2nd volume.      --9/2022 [facility] ([facility]):  presents for follow up with acute vulvitis, stress urinary incontinence and overactive bladder.. Patient is being currently treated for chronic and acute vulvitis and vulvar pruritus. She has been using RO soaks and topical steroids. She noticed only mild improvement and continues to have itching and scratching.    And also presents for follow-up of her urodynamics performed on August 22nd. The study revealed a normally sensate bladder of normal capacity and compliance. She did exhibit stress urinary incontinence at a fill of 392 cc with Valsalva leak point pressures of 27 and 34. She voids by detrusor contraction to completion with a postvoid residual of 60 cc. The bladder was stable throughout there was no evidence of DO or DI.      1. Overactive bladder  Reviewed urodynamics study with the patient which failed to reveal detrusor overactivity or detrusor overactivity incontinence.    2. 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 We discussed treatment options including possible mid urethral sling with bulking or staged approach with bulking later. Reviewed the nature and technique of the procedure, recovery, and potential risks and complications. Recommend a staged approach to avoid urinary retention. Patient is given literature on mid urethral sling and on bulking procedure.    3. Vulvar pruritus  Patient will continue to use the birth soaks in the topical hydrocortisone as well as her Vaseline mildly it barrier an effort to remove the condition of the skin of the vulva perianal area and upper thighs. We did discuss with her avoiding scratching the areas I will only incur further itching. She is also using some cold packs to help reduce the sensation. She is return to the office in 1 month to recheck vulva and we consider her treatment options for stress urinary incontinence.     2)  POP:  Absent.  (--) vaginal bleeding. (--) vaginal discharge. (--) sexually active.  (--) dyspareunia.  (+)  Vaginal dryness.  (--) vaginal estrogen use.   --POP-Q:  Aa -1; Ba -1; C -5; Ap -1; Bp -; D -6.  Genital hiatus 4, perineal body 2, total vaginal length 10-11.    3)  BM:  (--) constipation/straining.  (--) chronic diarrhea. (--) hematochezia.  (--) fecal incontinence.  (--) fecal smearing/urgency.  (+) complete evacuation.      4)  Vulvar irritation:  --using hydrocortisone cream daily  --scratches a lot  --uses ice packs  --using domeboro (aluminum and calcium) solution to soak  --has tried vaseline and EVVO  --thinks this is from staying wet with urine    05/26/2023  1)  Mixed urinary incontinence, urge < stress:    --denies UI  --voiding every 6 hours  --voiding 1/ night  --taking mirabegron    2)vaginal atrophy  -- using vaginal estrogen cream twice weekly     3)midline cystocele/ rectocele  --using pessary  --denies pain, bleeding, or  discharge    4)vulvar itching  -- 12/2021 hgba1c 7    09/21/2023  Taking myrbetriq  Has had more UI over the past few weeks  Using vaginal estrogen cream    Changes since last visit:  Taking myrbetriq daily.  Voiding every 6 hours-- does have UUI because she has limited mobility  Denies bleeding or discharge  Denies constipation         Past Medical History:   Diagnosis Date    History of colonic polyps 01/29/2021 11/19 [facility] - Findings: 2 sessile polyps hepatic flexure and rectosigmoid      Hypertension     Mood disorder 01/29/2021    Uncontrolled type 2 diabetes mellitus with hyperglycemia 01/29/2021   --B knee OA: uses wheelchair; has trouble straightening knees; is not able to walk much at all     --mood disorder: h/o situational depression (mother was killed by drunk )--resolved; followed by psych (Radha); takes effexor XR; currently stable     --DM2:    Lab Results   Component Value Date    HGBA1C 5.4 06/21/2023   --has been as high as 13%+ (could not have cataract surgery 8/2022)--now 7 on oral agents; no secondary disease; ? Mild B foot neuropathy    No past surgical history on file.  Xlap/Pfannenstiel for removal of benign/congenital tumor--L fallopian tube was looped around thisc    Family History   Problem Relation Age of Onset    Vision loss Mother     Diabetes Father     Cancer Father     Hearing loss Maternal Aunt     Hearing loss Maternal Grandmother     Stroke Paternal Grandmother        Social History     Socioeconomic History    Marital status:    Tobacco Use    Smoking status: Never    Smokeless tobacco: Never   Substance and Sexual Activity    Drug use: Not Currently       Current Outpatient Medications   Medication Sig Dispense Refill    ascorbic acid, vitamin C, 500 mg Chew       betamethasone valerate (LUXIQ) 0.12 % foam Apply topically.      cholecalciferol, vitamin D3, (VITAMIN D3) 50 mcg (2,000 unit) Cap capsule       desonide (DESOWEN) 0.05 % cream SMARTSIG:Topical 3-4  Times Daily PRN      ferrous fumarate-vitamin C 200 mg (65 mg iron)-25 mg TbSR Take by mouth.      FLOWFLEX COVID-19 AG HOME TEST Kit Use as Directed on the Package      glipiZIDE (GLUCOTROL) 10 MG tablet Take 1 tablet (10 mg total) by mouth 2 (two) times daily with meals. 180 tablet 12    ivermectin (SOOLANTRA) 1 % Crea Apply topically.      ketoconazole (NIZORAL) 2 % cream Apply topically 2 (two) times daily as needed.      ketoconazole (NIZORAL) 2 % shampoo Apply topically twice a week.      metFORMIN (GLUCOPHAGE) 500 MG tablet 2 pills BID2 pills  tablet 12    pioglitazone (ACTOS) 45 MG tablet Take 1 tablet (45 mg total) by mouth once daily. 90 tablet 4    venlafaxine (EFFEXOR-XR) 150 MG Cp24 Take  3 at night 270 capsule 12    [START ON 12/28/2023] estradioL (ESTRACE) 0.01 % (0.1 mg/gram) vaginal cream Place 1 g vaginally twice a week. APPLY DIME-SIZED AMOUNT    WITH FINGER IN             VAGINA/AROUND EXTERNAL     PARTS NIGHTLY. 42.5 g 0    mirabegron (MYRBETRIQ) 50 mg Tb24 Take 1 tablet (50 mg total) by mouth once daily. 90 tablet 4    omeprazole (PRILOSEC) 10 MG capsule Take 2 capsules (20 mg total) by mouth 2 (two) times daily before meals. 120 capsule 11     No current facility-administered medications for this visit.       Review of patient's allergies indicates:  No Known Allergies    Well woman:  Pap test:10/2022 normal hpv 16, other high risk positive  Colpo: Cxbx/ecc  1. CERVICAL BIOPSY AT 6 O'CLOCK SHOWS SMALL DETACHED FRAGMENTS OF SQUAMOUS   EPITHELIUM WITH MILD DYSPLASIA (CIN1) AND INFLAMMATION.   2. ENDOCERVICAL CURETTAGE SHOWS DETACHED FRAGMENTS OF SQUAMOUS EPITHELIUM   MILD DYSPLASIA AND KOILOCYTOTIC ATYPIA (CIN1).   09/2023 normal hpv negative History of abnormal paps: No.  History of STIs:  No  Mammogram: needs to schedule  Colonoscopy: Date of last: 2019.  Result:  Findings: 2 sessile polyps hepatic flexure and rectosigmoid.  Repeat due:  2029.    DEXA:  needs to schedule    ROS:  As per  HPI.      Exam  /65   Pulse 80   Ht 6' (1.829 m)   LMP  (LMP Unknown)   BMI 43.06 kg/m²   General: alert and oriented, no acute distress  Respiratory: normal respiratory effort  Abd: soft, non-tender, non-distended; +rectus diastasis, +umbilical hernia      Pelvic  Ext. Genitalia: normal external genitalia. Normal bartholin's and skeens glands.    Vagina: + atrophy. Normal vaginal mucosa without lesions. No discharge noted.   Non-tender bladder base without palpable mass.  #3 ring with support pessary in place  Cervix: no lesions  Uterus:  uterus is normal size, shape, consistency and nontender   Urethra: no masses or tenderness  Urethral meatus: no lesions, caruncle or prolapse.      Pessary removed without difficulty. Washed with soap and water. Reinserted without difficulty.     Impression  1. Cystocele, midline        2. Rectocele, female        3. Urinary incontinence, mixed  mirabegron (MYRBETRIQ) 50 mg Tb24      4. Pessary maintenance        5. Vaginal atrophy  estradioL (ESTRACE) 0.01 % (0.1 mg/gram) vaginal cream                We reviewed the above issues and discussed options for short-term versus long-term management of her problems.   Plan:   1)  Vulvar irritation:  --resolved at this time  --nystatin cream twice daily as needed     2)  Mixed urinary incontinence, urge < stress:    --continue to control diabetes: A1c 5.5%  --continue to work on weight loss  --Empty bladder every 2-3 hours.  Empty well: wait a minute, lean forward on toilet.    --Avoid dietary irritants (see sheet).  Keep diary x 3-5 days to determine your irritants.  --could not start pelvic floor PT. [facility] location is closed. Has cataracts and trouble driving.   --Hold off for now  --URGE: continue mirabegron to 50 mg daily  ---STRESS:  Pessary vs. Sling                      --need recheck for anterior prolapse before any surgery            3) cystocele/ rectocele  --for now continue #3 ring with support pessary  --consider  surgical correction (A&P repair if possible since uterus seems supported) in future   --if uterus not well-supported, could do TVH--high risk if need to convert to LSC or open   --need to repeat pap/HPV in 12/2023    --if still +, could we do LEEP instead? (Will ask ONC)   --need to do office cystometry to see if need sling    --make sure A1c <7.5%   --need to get clearance per PCP + labs (A1c, CBC, CMP, T&S)/EKG    4) well-woman:  --10/2022 pap normal/HPV 16+  --12/2022 colposcopy  Colposcopy Site:  Cervix  Position:  Supine  Acrowhite Lesion: No    Atypical Vessels: No    Transformation Zone Adequate?: No    Biopsy?: Yes         Location:  Cervix ((6 00))  ECC Performed?: Yes (tenaculum used to hold cervix still)    LEEP Performed?: No    Estimated blood loss (cc):  0  1. CERVICAL BIOPSY AT 6 O'CLOCK SHOWS SMALL DETACHED FRAGMENTS OF SQUAMOUS   EPITHELIUM WITH MILD DYSPLASIA (CIN1) AND INFLAMMATION.   2. ENDOCERVICAL CURETTAGE SHOWS DETACHED FRAGMENTS OF SQUAMOUS EPITHELIUM   MILD DYSPLASIA AND KOILOCYTOTIC ATYPIA (CIN1).   --continue to control diabetes  --repeat pap/HPV normal     4) RTC for pessary check in 3 months    I spent a total of 20 minutes on the day of the visit.  This includes face to face time and non-face to face time preparing to see the patient (eg, review of tests), obtaining and/or reviewing separately obtained history, documenting clinical information in the electronic or other health record, independently interpreting results and communicating results to the patient/family/caregiver, or care coordinator.     [facility]  Division of Female Pelvic Medicine and Reconstructive Surgery  Department of Obstetrics & Gynecology

## 2023-12-26 NOTE — PATIENT INSTRUCTIONS
1)  Vulvar irritation:  --resolved at this time  --nystatin cream twice daily as needed     2)  Mixed urinary incontinence, urge < stress:    --continue to control diabetes: A1c 5.5%  --continue to work on weight loss  --Empty bladder every 2-3 hours.  Empty well: wait a minute, lean forward on toilet.    --Avoid dietary irritants (see sheet).  Keep diary x 3-5 days to determine your irritants.  --could not start pelvic floor PT. [facility] location is closed. Has cataracts and trouble driving.   --Hold off for now  --URGE: continue mirabegron to 50 mg daily  ---STRESS:  Pessary vs. Sling                      --need recheck for anterior prolapse before any surgery            3) cystocele/ rectocele  --for now continue #3 ring with support pessary  --consider surgical correction (A&P repair if possible since uterus seems supported) in future   --if uterus not well-supported, could do TVH--high risk if need to convert to LSC or open   --need to repeat pap/HPV in 12/2023    --if still +, could we do LEEP instead? (Will ask ONC)   --need to do office cystometry to see if need sling    --make sure A1c <7.5%   --need to get clearance per PCP + labs (A1c, CBC, CMP, T&S)/EKG    4) well-woman:  --10/2022 pap normal/HPV 16+  --12/2022 colposcopy  Colposcopy Site:  Cervix  Position:  Supine  Acrowhite Lesion: No    Atypical Vessels: No    Transformation Zone Adequate?: No    Biopsy?: Yes         Location:  Cervix ((6 00))  ECC Performed?: Yes (tenaculum used to hold cervix still)    LEEP Performed?: No    Estimated blood loss (cc):  0  1. CERVICAL BIOPSY AT 6 O'CLOCK SHOWS SMALL DETACHED FRAGMENTS OF SQUAMOUS   EPITHELIUM WITH MILD DYSPLASIA (CIN1) AND INFLAMMATION.   2. ENDOCERVICAL CURETTAGE SHOWS DETACHED FRAGMENTS OF SQUAMOUS EPITHELIUM   MILD DYSPLASIA AND KOILOCYTOTIC ATYPIA (CIN1).   --continue to control diabetes  --repeat pap/HPV normal     4) RTC for pessary check in 3 months

## 2023-12-28 DIAGNOSIS — N95.2 VAGINAL ATROPHY: ICD-10-CM

## 2023-12-28 RX ORDER — ESTRADIOL 0.1 MG/G
1 CREAM VAGINAL
Qty: 42.5 G | Refills: 0 | Status: SHIPPED | OUTPATIENT
Start: 2023-12-28 | End: 2024-12-26

## 2023-12-29 ENCOUNTER — TELEPHONE (OUTPATIENT)
Dept: UROGYNECOLOGY | Facility: CLINIC | Age: 68
End: 2023-12-29
Payer: MEDICARE

## 2023-12-29 NOTE — TELEPHONE ENCOUNTER
RC to [facility] mail order [facility].Spoke with Halie to usage directions.     Marlin     ----- Message from Chema Gutiérrez sent at 12/29/2023 11:01 AM CST -----  Name of Who is Calling:[facility] calling for CHANEL QUACH [[id number]]                   What is the request in detail:They need to verify some orders please assist                   Can the clinic reply by [facility]: No                   What Number to Call Back if not in [facility]:[phone]  ref# [id number]

## 2024-03-26 ENCOUNTER — OFFICE VISIT (OUTPATIENT)
Dept: UROGYNECOLOGY | Facility: CLINIC | Age: 69
End: 2024-03-26
Payer: COMMERCIAL

## 2024-03-26 VITALS — SYSTOLIC BLOOD PRESSURE: 149 MMHG | DIASTOLIC BLOOD PRESSURE: 67 MMHG | BODY MASS INDEX: 43.06 KG/M2 | HEIGHT: 72 IN

## 2024-03-26 DIAGNOSIS — N95.2 VAGINAL ATROPHY: ICD-10-CM

## 2024-03-26 DIAGNOSIS — N81.11 CYSTOCELE, MIDLINE: Primary | ICD-10-CM

## 2024-03-26 DIAGNOSIS — N39.46 URINARY INCONTINENCE, MIXED: ICD-10-CM

## 2024-03-26 DIAGNOSIS — N81.6 RECTOCELE, FEMALE: ICD-10-CM

## 2024-03-26 DIAGNOSIS — Z46.89 PESSARY MAINTENANCE: ICD-10-CM

## 2024-03-26 PROCEDURE — 3078F DIAST BP <80 MM HG: CPT | Mod: CPTII,S$GLB,, | Performed by: NURSE PRACTITIONER

## 2024-03-26 PROCEDURE — 3008F BODY MASS INDEX DOCD: CPT | Mod: CPTII,S$GLB,, | Performed by: NURSE PRACTITIONER

## 2024-03-26 PROCEDURE — 1101F PT FALLS ASSESS-DOCD LE1/YR: CPT | Mod: CPTII,S$GLB,, | Performed by: NURSE PRACTITIONER

## 2024-03-26 PROCEDURE — 3288F FALL RISK ASSESSMENT DOCD: CPT | Mod: CPTII,S$GLB,, | Performed by: NURSE PRACTITIONER

## 2024-03-26 PROCEDURE — 1126F AMNT PAIN NOTED NONE PRSNT: CPT | Mod: CPTII,S$GLB,, | Performed by: NURSE PRACTITIONER

## 2024-03-26 PROCEDURE — 99999 PR PBB SHADOW E&M-EST. PATIENT-LVL III: CPT | Mod: PBBFAC,,, | Performed by: NURSE PRACTITIONER

## 2024-03-26 PROCEDURE — 99213 OFFICE O/P EST LOW 20 MIN: CPT | Mod: S$GLB,,, | Performed by: NURSE PRACTITIONER

## 2024-03-26 PROCEDURE — 3077F SYST BP >= 140 MM HG: CPT | Mod: CPTII,S$GLB,, | Performed by: NURSE PRACTITIONER

## 2024-03-26 PROCEDURE — 1159F MED LIST DOCD IN RCRD: CPT | Mod: CPTII,S$GLB,, | Performed by: NURSE PRACTITIONER

## 2024-03-26 PROCEDURE — 1160F RVW MEDS BY RX/DR IN RCRD: CPT | Mod: CPTII,S$GLB,, | Performed by: NURSE PRACTITIONER

## 2024-03-26 NOTE — PATIENT INSTRUCTIONS
1)  Vulvar irritation:  --MOSTLY resolved at this time  --nystatin cream twice daily as needed     2)  Mixed urinary incontinence, urge < stress:    --continue to control diabetes: A1c 5.5%  --continue to work on weight loss  --Empty bladder every 2-3 hours.  Empty well: wait a minute, lean forward on toilet.    --Avoid dietary irritants (see sheet).  Keep diary x 3-5 days to determine your irritants.  --could not start pelvic floor PT. [facility] location is closed. Has cataracts and trouble driving.   --Hold off for now  --URGE: stopped myrbetriq -- does not want medication at this time  ---STRESS:  Pessary vs. Sling                      --need recheck for anterior prolapse before any surgery            3) cystocele/ rectocele  --for now continue #3 ring with support pessary  --consider surgical correction (A&P repair if possible since uterus seems supported) in future   --if uterus not well-supported, could do TVH--high risk if need to convert to LSC or open   --need to repeat pap/HPV in 12/2023    --if still +, could we do LEEP instead? (Will ask ONC)   --need to do office cystometry to see if need sling    --make sure A1c <7.5%   --need to get clearance per PCP + labs (A1c, CBC, CMP, T&S)/EKG    4) well-woman:  --10/2022 pap normal/HPV 16+  --12/2022 colposcopy  Colposcopy Site:  Cervix  Position:  Supine  Acrowhite Lesion: No    Atypical Vessels: No    Transformation Zone Adequate?: No    Biopsy?: Yes         Location:  Cervix ((6 00))  ECC Performed?: Yes (tenaculum used to hold cervix still)    LEEP Performed?: No    Estimated blood loss (cc):  0  1. CERVICAL BIOPSY AT 6 O'CLOCK SHOWS SMALL DETACHED FRAGMENTS OF SQUAMOUS   EPITHELIUM WITH MILD DYSPLASIA (CIN1) AND INFLAMMATION.   2. ENDOCERVICAL CURETTAGE SHOWS DETACHED FRAGMENTS OF SQUAMOUS EPITHELIUM   MILD DYSPLASIA AND KOILOCYTOTIC ATYPIA (CIN1).   --continue to control diabetes  --repeat pap/HPV normal     4) RTC for pessary check in 3 months

## 2024-03-26 NOTE — PROGRESS NOTES
Urogyn follow up  03/25/2024  .  [facility] - UROGYNECOLOGY  [address] [address]    Sandi Dobson  [id number]  1955      Sandi Dobson is a 68 y.o. here for a urogyn follow up of mixed incontinence and pessary check     Last HPI from 10/20/2022     1)  UI:  (+) RUDY (bend, cough, sneeze) = (+) UUI  X years, worsening. Taking mirabegron 25 mg -- unsure if helps. Tried PFPT ([facility]) in 2020.    (--) pads--should be. Sleeps in lazy boy recliner, has a dog pad underneath her--has a few drops of urine. Also has pad in wheelchair. Leaks with transferring.  Daytime frequency: Q 2 hours. Tries to hold at least that.   Nocturia: Yes: 1/night.   (--) dysuria,  (--) hematuria,  (+) reported frequent UTIs. Sees PCP--sends in ABX/pyridium without testing. Not sure C&S are sent.  (--) complete bladder emptying. +PV urgency/hesitancy. DV with small 2nd volume.      --9/2022 [facility] ([facility]):  presents for follow up with acute vulvitis, stress urinary incontinence and overactive bladder.. Patient is being currently treated for chronic and acute vulvitis and vulvar pruritus. She has been using RO soaks and topical steroids. She noticed only mild improvement and continues to have itching and scratching.    And also presents for follow-up of her urodynamics performed on August 22nd. The study revealed a normally sensate bladder of normal capacity and compliance. She did exhibit stress urinary incontinence at a fill of 392 cc with Valsalva leak point pressures of 27 and 34. She voids by detrusor contraction to completion with a postvoid residual of 60 cc. The bladder was stable throughout there was no evidence of DO or DI.      1. Overactive bladder  Reviewed urodynamics study with the patient which failed to reveal detrusor overactivity or detrusor overactivity incontinence.    2. 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 We discussed treatment options including possible mid urethral sling with bulking or staged approach with bulking later. Reviewed the nature and technique of the procedure, recovery, and potential risks and complications. Recommend a staged approach to avoid urinary retention. Patient is given literature on mid urethral sling and on bulking procedure.    3. Vulvar pruritus  Patient will continue to use the birth soaks in the topical hydrocortisone as well as her Vaseline mildly it barrier an effort to remove the condition of the skin of the vulva perianal area and upper thighs. We did discuss with her avoiding scratching the areas I will only incur further itching. She is also using some cold packs to help reduce the sensation. She is return to the office in 1 month to recheck vulva and we consider her treatment options for stress urinary incontinence.     2)  POP:  Absent.  (--) vaginal bleeding. (--) vaginal discharge. (--) sexually active.  (--) dyspareunia.  (+)  Vaginal dryness.  (--) vaginal estrogen use.   --POP-Q:  Aa -1; Ba -1; C -5; Ap -1; Bp -; D -6.  Genital hiatus 4, perineal body 2, total vaginal length 10-11.    3)  BM:  (--) constipation/straining.  (--) chronic diarrhea. (--) hematochezia.  (--) fecal incontinence.  (--) fecal smearing/urgency.  (+) complete evacuation.      4)  Vulvar irritation:  --using hydrocortisone cream daily  --scratches a lot  --uses ice packs  --using domeboro (aluminum and calcium) solution to soak  --has tried vaseline and EVVO  --thinks this is from staying wet with urine    05/26/2023  1)  Mixed urinary incontinence, urge < stress:    --denies UI  --voiding every 6 hours  --voiding 1/ night  --taking mirabegron    2)vaginal atrophy  -- using vaginal estrogen cream twice weekly     3)midline cystocele/ rectocele  --using pessary  --denies pain, bleeding, or  discharge    4)vulvar itching  -- 12/2021 hgba1c 7    09/21/2023  Taking myrbetriq  Has had more UI over the past few weeks  Using vaginal estrogen cream    Changes since last visit:  Stopped myrbetriq 4 weeks ago-- voiding every 5-6 hours during the day. Denies UI  Denies bleeding or discharge  Denies constipation  Using vaginal estrogen cream         Past Medical History:   Diagnosis Date    History of colonic polyps 01/29/2021 11/19 [facility] - Findings: 2 sessile polyps hepatic flexure and rectosigmoid      Hypertension     Mood disorder 01/29/2021    Uncontrolled type 2 diabetes mellitus with hyperglycemia 01/29/2021   --B knee OA: uses wheelchair; has trouble straightening knees; is not able to walk much at all     --mood disorder: h/o situational depression (mother was killed by drunk )--resolved; followed by psych (Radha); takes effexor XR; currently stable     --DM2:    Lab Results   Component Value Date    HGBA1C 5.4 06/21/2023   --has been as high as 13%+ (could not have cataract surgery 8/2022)--now 7 on oral agents; no secondary disease; ? Mild B foot neuropathy    History reviewed. No pertinent surgical history.  Xlap/Pfannenstiel for removal of benign/congenital tumor--L fallopian tube was looped around thisc    Family History   Problem Relation Age of Onset    Vision loss Mother     Diabetes Father     Cancer Father     Hearing loss Maternal Aunt     Hearing loss Maternal Grandmother     Stroke Paternal Grandmother        Social History     Socioeconomic History    Marital status:    Tobacco Use    Smoking status: Never    Smokeless tobacco: Never   Substance and Sexual Activity    Drug use: Not Currently       Current Outpatient Medications   Medication Sig Dispense Refill    ascorbic acid, vitamin C, 500 mg Chew       betamethasone valerate (LUXIQ) 0.12 % foam Apply topically.      cholecalciferol, vitamin D3, (VITAMIN D3) 50 mcg (2,000 unit) Cap capsule       desonide (DESOWEN) 0.05 %  cream SMARTSIG:Topical 3-4 Times Daily PRN      estradioL (ESTRACE) 0.01 % (0.1 mg/gram) vaginal cream PLACE 1 G VAGINALLY TWICE A WEEK. APPLY DIME-SIZED AMOUNT    WITH FINGER IN             VAGINA/AROUND EXTERNAL     PARTS NIGHTLY. 42.5 g 0    ferrous fumarate-vitamin C 200 mg (65 mg iron)-25 mg TbSR Take by mouth.      FLOWFLEX COVID-19 AG HOME TEST Kit Use as Directed on the Package      glipiZIDE (GLUCOTROL) 10 MG tablet Take 1 tablet (10 mg total) by mouth 2 (two) times daily with meals. 180 tablet 12    ivermectin (SOOLANTRA) 1 % Crea Apply topically.      ketoconazole (NIZORAL) 2 % cream Apply topically 2 (two) times daily as needed.      ketoconazole (NIZORAL) 2 % shampoo Apply topically twice a week.      metFORMIN (GLUCOPHAGE) 500 MG tablet 2 pills BID2 pills  tablet 12    mirabegron (MYRBETRIQ) 50 mg Tb24 Take 1 tablet (50 mg total) by mouth once daily. 90 tablet 4    pioglitazone (ACTOS) 45 MG tablet Take 1 tablet (45 mg total) by mouth once daily. 90 tablet 4    venlafaxine (EFFEXOR-XR) 150 MG Cp24 Take  3 at night 270 capsule 12    omeprazole (PRILOSEC) 10 MG capsule Take 2 capsules (20 mg total) by mouth 2 (two) times daily before meals. 120 capsule 11     No current facility-administered medications for this visit.       Review of patient's allergies indicates:  No Known Allergies    Well woman:  Pap test:10/2022 normal hpv 16, other high risk positive  Colpo: Cxbx/ecc  1. CERVICAL BIOPSY AT 6 O'CLOCK SHOWS SMALL DETACHED FRAGMENTS OF SQUAMOUS   EPITHELIUM WITH MILD DYSPLASIA (CIN1) AND INFLAMMATION.   2. ENDOCERVICAL CURETTAGE SHOWS DETACHED FRAGMENTS OF SQUAMOUS EPITHELIUM   MILD DYSPLASIA AND KOILOCYTOTIC ATYPIA (CIN1).   09/2023 normal hpv negative History of abnormal paps: No.  History of STIs:  No  Mammogram: needs to schedule  Colonoscopy: Date of last: 2019.  Result:  Findings: 2 sessile polyps hepatic flexure and rectosigmoid.  Repeat due:  2029.    DEXA:  needs to schedule    ROS:   As per HPI.      Exam  BP (!) 149/67 (BP Location: Left arm, Patient Position: Sitting, BP Method: Large (Automatic))   Ht 6' (1.829 m)   LMP  (LMP Unknown)   BMI 43.06 kg/m²   General: alert and oriented, no acute distress  Respiratory: normal respiratory effort  Abd: soft, non-tender, non-distended; +rectus diastasis, +umbilical hernia      Pelvic  Ext. Genitalia: normal external genitalia. Normal bartholin's and skeens glands.  Mild irritation in skin folds  Vagina: + atrophy. Normal vaginal mucosa without lesions. No discharge noted.   Non-tender bladder base without palpable mass.  #3 ring with support pessary in place  Cervix: no lesions  Uterus:  uterus is normal size, shape, consistency and nontender   Urethra: no masses or tenderness  Urethral meatus: no lesions, caruncle or prolapse.      Pessary removed without difficulty. Washed with soap and water. Reinserted without difficulty.     Impression  1. Cystocele, midline        2. Rectocele, female        3. Urinary incontinence, mixed        4. Pessary maintenance        5. Vaginal atrophy                    We reviewed the above issues and discussed options for short-term versus long-term management of her problems.   Plan:   1)  Vulvar irritation:  --MOSTLY resolved at this time  --nystatin cream twice daily as needed     2)  Mixed urinary incontinence, urge < stress:    --continue to control diabetes: A1c 5.5%  --continue to work on weight loss  --Empty bladder every 2-3 hours.  Empty well: wait a minute, lean forward on toilet.    --Avoid dietary irritants (see sheet).  Keep diary x 3-5 days to determine your irritants.  --could not start pelvic floor PT. [facility]. Has cataracts and trouble driving.   --Hold off for now  --URGE: stopped myrbetriq -- does not want medication at this time  ---STRESS:  Pessary vs. Sling                      --need recheck for anterior prolapse before any surgery            3) cystocele/ rectocele  --for  now continue #3 ring with support pessary  --consider surgical correction (A&P repair if possible since uterus seems supported) in future   --if uterus not well-supported, could do TVH--high risk if need to convert to LSC or open   --need to repeat pap/HPV in 12/2023    --if still +, could we do LEEP instead? (Will ask ONC)   --need to do office cystometry to see if need sling    --make sure A1c <7.5%   --need to get clearance per PCP + labs (A1c, CBC, CMP, T&S)/EKG    4) well-woman:  --10/2022 pap normal/HPV 16+  --12/2022 colposcopy  Colposcopy Site:  Cervix  Position:  Supine  Acrowhite Lesion: No    Atypical Vessels: No    Transformation Zone Adequate?: No    Biopsy?: Yes         Location:  Cervix ((6 00))  ECC Performed?: Yes (tenaculum used to hold cervix still)    LEEP Performed?: No    Estimated blood loss (cc):  0  1. CERVICAL BIOPSY AT 6 O'CLOCK SHOWS SMALL DETACHED FRAGMENTS OF SQUAMOUS   EPITHELIUM WITH MILD DYSPLASIA (CIN1) AND INFLAMMATION.   2. ENDOCERVICAL CURETTAGE SHOWS DETACHED FRAGMENTS OF SQUAMOUS EPITHELIUM   MILD DYSPLASIA AND KOILOCYTOTIC ATYPIA (CIN1).   --continue to control diabetes  --repeat pap/HPV normal     4) RTC for pessary check in 3 months    I spent a total of 20 minutes on the day of the visit.  This includes face to face time and non-face to face time preparing to see the patient (eg, review of tests), obtaining and/or reviewing separately obtained history, documenting clinical information in the electronic or other health record, independently interpreting results and communicating results to the patient/family/caregiver, or care coordinator.     [facility]  Division of Female Pelvic Medicine and Reconstructive Surgery  Department of Obstetrics & Gynecology

## 2024-06-13 ENCOUNTER — TELEPHONE (OUTPATIENT)
Dept: FAMILY MEDICINE | Facility: CLINIC | Age: 69
End: 2024-06-13
Payer: MEDICARE

## 2024-06-13 DIAGNOSIS — Z12.31 ENCOUNTER FOR SCREENING MAMMOGRAM FOR BREAST CANCER: Primary | ICD-10-CM

## 2024-06-13 NOTE — TELEPHONE ENCOUNTER
----- Message from Enma Rivera sent at 6/13/2024  3:48 PM CDT -----  Name of Who is Calling: CHANEL QUACH [[id number]]          What is the request in detail: PT need mammogram orders faxed to [facility] in [place], [place].  Fax number for [phone]          Can the clinic reply by [facility]: No          What [facility] to [facility]  [phone]  
Order for mammo placed and faxed.  
No bruits; no thyromegaly or nodules

## 2024-06-27 ENCOUNTER — OFFICE VISIT (OUTPATIENT)
Dept: UROGYNECOLOGY | Facility: CLINIC | Age: 69
End: 2024-06-27
Payer: COMMERCIAL

## 2024-06-27 VITALS
BODY MASS INDEX: 43.06 KG/M2 | HEART RATE: 82 BPM | DIASTOLIC BLOOD PRESSURE: 76 MMHG | HEIGHT: 72 IN | SYSTOLIC BLOOD PRESSURE: 129 MMHG

## 2024-06-27 DIAGNOSIS — N93.9 VAGINAL BLEEDING: ICD-10-CM

## 2024-06-27 DIAGNOSIS — Z46.89 PESSARY MAINTENANCE: ICD-10-CM

## 2024-06-27 DIAGNOSIS — N39.46 URINARY INCONTINENCE, MIXED: ICD-10-CM

## 2024-06-27 DIAGNOSIS — N81.6 RECTOCELE, FEMALE: ICD-10-CM

## 2024-06-27 DIAGNOSIS — N81.11 CYSTOCELE, MIDLINE: Primary | ICD-10-CM

## 2024-06-27 DIAGNOSIS — N95.2 VAGINAL ATROPHY: ICD-10-CM

## 2024-06-27 LAB
BACTERIA #/AREA URNS AUTO: ABNORMAL /HPF
HYALINE CASTS UR QL AUTO: 4 /LPF
MICROSCOPIC COMMENT: ABNORMAL
RBC #/AREA URNS AUTO: 0 /HPF (ref 0–4)
SQUAMOUS #/AREA URNS AUTO: 1 /HPF
WBC #/AREA URNS AUTO: 2 /HPF (ref 0–5)

## 2024-06-27 PROCEDURE — 3008F BODY MASS INDEX DOCD: CPT | Mod: CPTII,S$GLB,, | Performed by: NURSE PRACTITIONER

## 2024-06-27 PROCEDURE — 1160F RVW MEDS BY RX/DR IN RCRD: CPT | Mod: CPTII,S$GLB,, | Performed by: NURSE PRACTITIONER

## 2024-06-27 PROCEDURE — 1159F MED LIST DOCD IN RCRD: CPT | Mod: CPTII,S$GLB,, | Performed by: NURSE PRACTITIONER

## 2024-06-27 PROCEDURE — 3074F SYST BP LT 130 MM HG: CPT | Mod: CPTII,S$GLB,, | Performed by: NURSE PRACTITIONER

## 2024-06-27 PROCEDURE — 99213 OFFICE O/P EST LOW 20 MIN: CPT | Mod: S$GLB,,, | Performed by: NURSE PRACTITIONER

## 2024-06-27 PROCEDURE — 1126F AMNT PAIN NOTED NONE PRSNT: CPT | Mod: CPTII,S$GLB,, | Performed by: NURSE PRACTITIONER

## 2024-06-27 PROCEDURE — 81001 URINALYSIS AUTO W/SCOPE: CPT | Performed by: NURSE PRACTITIONER

## 2024-06-27 PROCEDURE — 3288F FALL RISK ASSESSMENT DOCD: CPT | Mod: CPTII,S$GLB,, | Performed by: NURSE PRACTITIONER

## 2024-06-27 PROCEDURE — 87086 URINE CULTURE/COLONY COUNT: CPT | Performed by: NURSE PRACTITIONER

## 2024-06-27 PROCEDURE — 3078F DIAST BP <80 MM HG: CPT | Mod: CPTII,S$GLB,, | Performed by: NURSE PRACTITIONER

## 2024-06-27 PROCEDURE — 1101F PT FALLS ASSESS-DOCD LE1/YR: CPT | Mod: CPTII,S$GLB,, | Performed by: NURSE PRACTITIONER

## 2024-06-27 PROCEDURE — 99999 PR PBB SHADOW E&M-EST. PATIENT-LVL IV: CPT | Mod: PBBFAC,,, | Performed by: NURSE PRACTITIONER

## 2024-06-27 RX ORDER — VIBEGRON 75 MG/1
75 TABLET, FILM COATED ORAL DAILY
Qty: 30 TABLET | Refills: 11 | Status: SHIPPED | OUTPATIENT
Start: 2024-06-27

## 2024-06-27 NOTE — PROGRESS NOTES
Urogyn follow up  03/25/2024  .  [facility] - UROGYNECOLOGY  [address] [id number]    Sandi Dobson  [id number]  1955      Sandi Dobson is a 69 y.o. here for a urogyn follow up of mixed incontinence and pessary check     Last HPI from 10/20/2022     1)  UI:  (+) RUDY (bend, cough, sneeze) = (+) UUI  X years, worsening. Taking mirabegron 25 mg -- unsure if helps. Tried PFPT ([place]) in 2020.    (--) pads--should be. Sleeps in lazy boy recliner, has a dog pad underneath her--has a few drops of urine. Also has pad in wheelchair. Leaks with transferring.  Daytime frequency: Q 2 hours. Tries to hold at least that.   Nocturia: Yes: 1/night.   (--) dysuria,  (--) hematuria,  (+) reported frequent UTIs. Sees PCP--sends in ABX/pyridium without testing. Not sure C&S are sent.  (--) complete bladder emptying. +PV urgency/hesitancy. DV with small 2nd volume.      --9/2022 [facility] ([facility]):  presents for follow up with acute vulvitis, stress urinary incontinence and overactive bladder.. Patient is being currently treated for chronic and acute vulvitis and vulvar pruritus. She has been using RO soaks and topical steroids. She noticed only mild improvement and continues to have itching and scratching.    And also presents for follow-up of her urodynamics performed on August 22nd. The study revealed a normally sensate bladder of normal capacity and compliance. She did exhibit stress urinary incontinence at a fill of 392 cc with Valsalva leak point pressures of 27 and 34. She voids by detrusor contraction to completion with a postvoid residual of 60 cc. The bladder was stable throughout there was no evidence of DO or DI.      1. Overactive bladder  Reviewed urodynamics study with the patient which failed to reveal detrusor overactivity or detrusor overactivity incontinence.    2. 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 We discussed treatment options including possible mid urethral sling with bulking or staged approach with bulking later. Reviewed the nature and technique of the procedure, recovery, and potential risks and complications. Recommend a staged approach to avoid urinary retention. Patient is given literature on mid urethral sling and on bulking procedure.    3. Vulvar pruritus  Patient will continue to use the birth soaks in the topical hydrocortisone as well as her Vaseline mildly it barrier an effort to remove the condition of the skin of the vulva perianal area and upper thighs. We did discuss with her avoiding scratching the areas I will only incur further itching. She is also using some cold packs to help reduce the sensation. She is return to the office in 1 month to recheck vulva and we consider her treatment options for stress urinary incontinence.     2)  POP:  Absent.  (--) vaginal bleeding. (--) vaginal discharge. (--) sexually active.  (--) dyspareunia.  (+)  Vaginal dryness.  (--) vaginal estrogen use.   --POP-Q:  Aa -1; Ba -1; C -5; Ap -1; Bp -; D -6.  Genital hiatus 4, perineal body 2, total vaginal length 10-11.    3)  BM:  (--) constipation/straining.  (--) chronic diarrhea. (--) hematochezia.  (--) fecal incontinence.  (--) fecal smearing/urgency.  (+) complete evacuation.      4)  Vulvar irritation:  --using hydrocortisone cream daily  --scratches a lot  --uses ice packs  --using domeboro (aluminum and calcium) solution to soak  --has tried vaseline and EVVO  --thinks this is from staying wet with urine    05/26/2023  1)  Mixed urinary incontinence, urge < stress:    --denies UI  --voiding every 6 hours  --voiding 1/ night  --taking mirabegron    2)vaginal atrophy  -- using vaginal estrogen cream twice weekly     3)midline cystocele/ rectocele  --using pessary  --denies pain, bleeding, or  discharge    4)vulvar itching  -- 12/2021 hgba1c 7    09/21/2023  Taking myrbetriq  Has had more UI over the past few weeks  Using vaginal estrogen cream    Changes since last visit:  Stopped myrbetriq 4 weeks ago-- voiding every 5-6 hours during the day. Denies UI  Denies bleeding or discharge  Denies constipation  Using vaginal estrogen cream         Past Medical History:   Diagnosis Date    History of colonic polyps 01/29/2021 11/19 [facility] - Findings: 2 sessile polyps hepatic flexure and rectosigmoid      Hypertension     Mood disorder 01/29/2021    Uncontrolled type 2 diabetes mellitus with hyperglycemia 01/29/2021   --B knee OA: uses wheelchair; has trouble straightening knees; is not able to walk much at all     --mood disorder: h/o situational depression (mother was killed by drunk )--resolved; followed by psych (Radha); takes effexor XR; currently stable     --DM2:    Lab Results   Component Value Date    HGBA1C 5.4 06/21/2023   --has been as high as 13%+ (could not have cataract surgery 8/2022)--now 7 on oral agents; no secondary disease; ? Mild B foot neuropathy    History reviewed. No pertinent surgical history.  Xlap/Pfannenstiel for removal of benign/congenital tumor--L fallopian tube was looped around thisc    Family History   Problem Relation Name Age of Onset    Vision loss Mother      Diabetes Father      Cancer Father      Hearing loss Maternal Aunt      Hearing loss Maternal Grandmother      Stroke Paternal Grandmother         Social History     Socioeconomic History    Marital status:    Tobacco Use    Smoking status: Never    Smokeless tobacco: Never   Substance and Sexual Activity    Drug use: Not Currently     Social Determinants of Health     Financial Resource Strain: Low Risk  (7/14/2022)    [facility]    Overall Financial Resource Strain (CARDIA)     Difficulty of Paying Living Expenses: Not hard at all   Food Insecurity: No Food Insecurity  (7/14/2022)    [facility]    Hunger Vital Sign     Worried About Running Out of Food in the Last Year: Never true     Ran Out of Food in the Last Year: Never true   Transportation Needs: No Transportation Needs (7/14/2022)    Received from [facility]    PRAPARE - Transportation     Lack of Transportation (Medical): No     Lack of Transportation (Non-Medical): No   Physical Activity: Unknown (7/14/2022)    Received from [facility]    Exercise Vital Sign     Days of Exercise per Week: 0 days   Stress: No Stress Concern Present (7/14/2022)    Received from [facility]    [employer] [place] [employer] - Occupational Stress Questionnaire     Feeling of Stress : Not at all   Housing Stability: Low Risk  (7/14/2022)    Received from [facility]    Housing Stability Vital Sign     Unable to Pay for Housing in the Last Year: No     Number of Places Lived in the Last Year: 1     In the last 12 months, was there a time when you did not have a steady place to sleep or slept in a shelter (including now)?: No       Current Outpatient Medications   Medication Sig Dispense Refill    ascorbic acid, vitamin C, 500 mg Chew       betamethasone valerate (LUXIQ) 0.12 % foam Apply topically.      cholecalciferol, vitamin D3, (VITAMIN D3) 50 mcg (2,000 unit) Cap capsule       desonide (DESOWEN) 0.05 % cream SMARTSIG:Topical 3-4 Times Daily PRN      estradioL (ESTRACE) 0.01 % (0.1 mg/gram) vaginal cream PLACE 1 G VAGINALLY TWICE A WEEK. APPLY DIME-SIZED AMOUNT    WITH FINGER IN             VAGINA/AROUND EXTERNAL     PARTS NIGHTLY. 42.5 g 0    ferrous fumarate-vitamin C 200 mg (65 mg iron)-25 mg TbSR Take by mouth.      FLOWFLEX COVID-19 AG HOME TEST Kit Use as Directed on the Package      glipiZIDE (GLUCOTROL) 10 MG tablet Take 1 tablet (10 mg total) by mouth 2 (two) times daily with meals. 180 tablet 12    ivermectin (SOOLANTRA) 1 % Crea Apply  topically.      ketoconazole (NIZORAL) 2 % cream Apply topically 2 (two) times daily as needed.      ketoconazole (NIZORAL) 2 % shampoo Apply topically twice a week.      metFORMIN (GLUCOPHAGE) 500 MG tablet 2 pills BID2 pills  tablet 12    pioglitazone (ACTOS) 45 MG tablet Take 1 tablet (45 mg total) by mouth once daily. 90 tablet 4    venlafaxine (EFFEXOR-XR) 150 MG Cp24 Take  3 at night 270 capsule 12    omeprazole (PRILOSEC) 10 MG capsule Take 2 capsules (20 mg total) by mouth 2 (two) times daily before meals. 120 capsule 11    vibegron (GEMTESA) 75 mg Tab Take 1 tablet (75 mg total) by mouth once daily. 30 tablet 11     No current facility-administered medications for this visit.       Review of patient's allergies indicates:  No Known Allergies    Well woman:  Pap test:09/2023 normal HPV negative  10/2022 normal hpv 16, other high risk positive  Colpo: Cxbx/ecc  1. CERVICAL BIOPSY AT 6 O'CLOCK SHOWS SMALL DETACHED FRAGMENTS OF SQUAMOUS   EPITHELIUM WITH MILD DYSPLASIA (CIN1) AND INFLAMMATION.   2. ENDOCERVICAL CURETTAGE SHOWS DETACHED FRAGMENTS OF SQUAMOUS EPITHELIUM   MILD DYSPLASIA AND KOILOCYTOTIC ATYPIA (CIN1).   09/2023 normal hpv negative History of abnormal paps: No.  History of STIs:  No  Mammogram: needs to schedule  Colonoscopy: Date of last: 2019.  Result:  Findings: 2 sessile polyps hepatic flexure and rectosigmoid.  Repeat due:  2029.    DEXA:  needs to schedule    ROS:  As per HPI.      Exam  /76 (BP Location: Right arm, Patient Position: Sitting, BP Method: Large (Automatic))   Pulse 82   Ht 6' (1.829 m)   LMP  (LMP Unknown)   BMI 43.06 kg/m²   General: alert and oriented, no acute distress  Respiratory: normal respiratory effort  Abd: soft, non-tender, non-distended; +rectus diastasis, +umbilical hernia      Pelvic  Ext. Genitalia: normal external genitalia. Normal bartholin's and skeens glands.  Mild irritation in skin folds  Vagina: + atrophy. Normal vaginal mucosa without  lesions. No discharge noted.   Non-tender bladder base without palpable mass.  #3 ring with support pessary in place  Cervix: no lesions  Uterus:  uterus is normal size, shape, consistency and nontender   Urethra: no masses or tenderness  Urethral meatus: no lesions, caruncle or prolapse.      Pessary removed without difficulty. Washed with soap and water. Reinserted without difficulty.     Impression  1. Cystocele, midline        2. Urinary incontinence, mixed  vibegron (GEMTESA) 75 mg Tab      3. Rectocele, female        4. Vaginal bleeding  Urinalysis Microscopic    Urine culture      5. Pessary maintenance        6. Vaginal atrophy              We reviewed the above issues and discussed options for short-term versus long-term management of her problems.   Plan:   1)  Vulvar irritation:  --MOSTLY resolved at this time  --nystatin cream twice daily as needed  --use estrogen cream on outer labia     2)  Mixed urinary incontinence, urge < stress:    --continue to control diabetes: A1c 5.5%  --continue to work on weight loss  --Empty bladder every 2-3 hours.  Empty well: wait a minute, lean forward on toilet.    --Avoid dietary irritants (see sheet).  Keep diary x 3-5 days to determine your irritants.  --could not start pelvic floor PT. [facility] location is closed. Has cataracts and trouble driving.   --Hold off for now  --URGE trial gemtesa 75 mg daily  ---STRESS:  Pessary vs. Sling                      --need recheck for anterior prolapse before any surgery  --urine culture  --urine micro            3) cystocele/ rectocele  --for now continue #3 ring with support pessary  --consider surgical correction (A&P repair if possible since uterus seems supported) in future   --if uterus not well-supported, could do TVH--high risk if need to convert to LSC or open   --need to repeat pap/HPV in 12/2023    --if still +, could we do LEEP instead? (Will ask ONC)   --need to do office cystometry to see if need sling    --make sure  A1c <7.5%   --need to get clearance per PCP + labs (A1c, CBC, CMP, T&S)/EKG    4) well-woman:  --10/2022 pap normal/HPV 16+  --12/2022 colposcopy  Colposcopy Site:  Cervix  Position:  Supine  Acrowhite Lesion: No    Atypical Vessels: No    Transformation Zone Adequate?: No    Biopsy?: Yes         Location:  Cervix ((6 00))  ECC Performed?: Yes (tenaculum used to hold cervix still)    LEEP Performed?: No    Estimated blood loss (cc):  0  1. CERVICAL BIOPSY AT 6 O'CLOCK SHOWS SMALL DETACHED FRAGMENTS OF SQUAMOUS   EPITHELIUM WITH MILD DYSPLASIA (CIN1) AND INFLAMMATION.   2. ENDOCERVICAL CURETTAGE SHOWS DETACHED FRAGMENTS OF SQUAMOUS EPITHELIUM   MILD DYSPLASIA AND KOILOCYTOTIC ATYPIA (CIN1).   --continue to control diabetes  --repeat pap/HPV normal     4)? Vaginal bleeding  --no blood in vault  --mild irritation around vaginal opening  --? Varices on R labia  --neg heme test today    5) RTC for pessary check in 3 months    I spent a total of 20 minutes on the day of the visit.  This includes face to face time and non-face to face time preparing to see the patient (eg, review of tests), obtaining and/or reviewing separately obtained history, documenting clinical information in the electronic or other health record, independently interpreting results and communicating results to the patient/family/caregiver, or care coordinator.     Lizzeth Schmidt, JASON-BC  [facility]  Division of Female Pelvic Medicine and Reconstructive Surgery  Department of Obstetrics & Gynecology

## 2024-06-27 NOTE — PATIENT INSTRUCTIONS
1)  Vulvar irritation:  --MOSTLY resolved at this time  --nystatin cream twice daily as needed  --use estrogen cream on outer labia     2)  Mixed urinary incontinence, urge < stress:    --continue to control diabetes: A1c 5.5%  --continue to work on weight loss  --Empty bladder every 2-3 hours.  Empty well: wait a minute, lean forward on toilet.    --Avoid dietary irritants (see sheet).  Keep diary x 3-5 days to determine your irritants.  --could not start pelvic floor PT. [facility] location is closed. Has cataracts and trouble driving.   --Hold off for now  --URGE trial gemtesa 75 mg daily  ---STRESS:  Pessary vs. Sling                      --need recheck for anterior prolapse before any surgery  --urine culture  --urine micro            3) cystocele/ rectocele  --for now continue #3 ring with support pessary  --consider surgical correction (A&P repair if possible since uterus seems supported) in future   --if uterus not well-supported, could do TVH--high risk if need to convert to LSC or open   --need to repeat pap/HPV in 12/2023    --if still +, could we do LEEP instead? (Will ask ONC)   --need to do office cystometry to see if need sling    --make sure A1c <7.5%   --need to get clearance per PCP + labs (A1c, CBC, CMP, T&S)/EKG    4) well-woman:  --10/2022 pap normal/HPV 16+  --12/2022 colposcopy  Colposcopy Site:  Cervix  Position:  Supine  Acrowhite Lesion: No    Atypical Vessels: No    Transformation Zone Adequate?: No    Biopsy?: Yes         Location:  Cervix ((6 00))  ECC Performed?: Yes (tenaculum used to hold cervix still)    LEEP Performed?: No    Estimated blood loss (cc):  0  1. CERVICAL BIOPSY AT 6 O'CLOCK SHOWS SMALL DETACHED FRAGMENTS OF SQUAMOUS   EPITHELIUM WITH MILD DYSPLASIA (CIN1) AND INFLAMMATION.   2. ENDOCERVICAL CURETTAGE SHOWS DETACHED FRAGMENTS OF SQUAMOUS EPITHELIUM   MILD DYSPLASIA AND KOILOCYTOTIC ATYPIA (CIN1).   --continue to control diabetes  --repeat pap/HPV normal     4)? Vaginal  bleeding  --no blood in vault  --mild irritation around vaginal opening  --? Varices on R labia  --neg heme test today    5) RTC for pessary check in 3 months

## 2024-06-28 LAB — BACTERIA UR CULT: NO GROWTH

## 2024-07-05 LAB — BCS RECOMMENDATION EXT: NORMAL

## 2024-07-16 ENCOUNTER — PATIENT OUTREACH (OUTPATIENT)
Dept: ADMINISTRATIVE | Facility: HOSPITAL | Age: 69
End: 2024-07-16
Payer: MEDICARE

## 2024-07-16 DIAGNOSIS — E11.9 TYPE 2 DIABETES MELLITUS WITHOUT COMPLICATION, WITHOUT LONG-TERM CURRENT USE OF INSULIN: Primary | ICD-10-CM

## 2024-08-02 ENCOUNTER — OFFICE VISIT (OUTPATIENT)
Dept: FAMILY MEDICINE | Facility: CLINIC | Age: 69
End: 2024-08-02
Payer: COMMERCIAL

## 2024-08-02 VITALS
OXYGEN SATURATION: 95 % | HEART RATE: 83 BPM | HEIGHT: 72 IN | BODY MASS INDEX: 43.06 KG/M2 | TEMPERATURE: 98 F | DIASTOLIC BLOOD PRESSURE: 72 MMHG | SYSTOLIC BLOOD PRESSURE: 126 MMHG

## 2024-08-02 DIAGNOSIS — I10 ESSENTIAL HYPERTENSION: ICD-10-CM

## 2024-08-02 DIAGNOSIS — N39.3 SUI (STRESS URINARY INCONTINENCE, FEMALE): ICD-10-CM

## 2024-08-02 DIAGNOSIS — F39 MOOD DISORDER: ICD-10-CM

## 2024-08-02 DIAGNOSIS — E78.49 OTHER HYPERLIPIDEMIA: ICD-10-CM

## 2024-08-02 DIAGNOSIS — E11.9 TYPE 2 DIABETES MELLITUS WITHOUT COMPLICATION, WITHOUT LONG-TERM CURRENT USE OF INSULIN: ICD-10-CM

## 2024-08-02 DIAGNOSIS — N32.81 OVERACTIVE BLADDER: ICD-10-CM

## 2024-08-02 DIAGNOSIS — N39.0 FREQUENT UTI: ICD-10-CM

## 2024-08-02 DIAGNOSIS — E08.22 DIABETES MELLITUS DUE TO UNDERLYING CONDITION WITH STAGE 3 CHRONIC KIDNEY DISEASE, WITHOUT LONG-TERM CURRENT USE OF INSULIN, UNSPECIFIED WHETHER STAGE 3A OR 3B CKD: ICD-10-CM

## 2024-08-02 DIAGNOSIS — Z86.010 HISTORY OF COLONIC POLYPS: ICD-10-CM

## 2024-08-02 DIAGNOSIS — Z00.00 ROUTINE MEDICAL EXAM: Primary | ICD-10-CM

## 2024-08-02 DIAGNOSIS — N18.32 STAGE 3B CHRONIC KIDNEY DISEASE: ICD-10-CM

## 2024-08-02 DIAGNOSIS — Z12.31 ENCOUNTER FOR SCREENING MAMMOGRAM FOR BREAST CANCER: ICD-10-CM

## 2024-08-02 DIAGNOSIS — N18.30 DIABETES MELLITUS DUE TO UNDERLYING CONDITION WITH STAGE 3 CHRONIC KIDNEY DISEASE, WITHOUT LONG-TERM CURRENT USE OF INSULIN, UNSPECIFIED WHETHER STAGE 3A OR 3B CKD: ICD-10-CM

## 2024-08-02 DIAGNOSIS — E66.01 SEVERE OBESITY (BMI 35.0-39.9) WITH COMORBIDITY: ICD-10-CM

## 2024-08-02 PROCEDURE — 99999 PR PBB SHADOW E&M-EST. PATIENT-LVL III: CPT | Mod: PBBFAC,,, | Performed by: INTERNAL MEDICINE

## 2024-08-02 RX ORDER — OXYBUTYNIN CHLORIDE 10 MG/1
1 TABLET, EXTENDED RELEASE ORAL DAILY
COMMUNITY
Start: 2024-06-27 | End: 2024-09-25

## 2024-08-02 RX ORDER — ALPRAZOLAM 1 MG/1
1 TABLET ORAL 3 TIMES DAILY PRN
COMMUNITY
Start: 2024-06-26 | End: 2024-10-24

## 2024-08-02 RX ORDER — METFORMIN HYDROCHLORIDE 500 MG/1
TABLET ORAL
Qty: 360 TABLET | Refills: 3 | Status: SHIPPED | OUTPATIENT
Start: 2024-08-02

## 2024-08-02 RX ORDER — SULFAMETHOXAZOLE AND TRIMETHOPRIM 800; 160 MG/1; MG/1
1 TABLET ORAL 2 TIMES DAILY
Qty: 6 TABLET | Refills: 0 | Status: SHIPPED | OUTPATIENT
Start: 2024-08-02 | End: 2024-08-05

## 2024-08-02 RX ORDER — PIOGLITAZONEHYDROCHLORIDE 45 MG/1
45 TABLET ORAL DAILY
Qty: 90 TABLET | Refills: 3 | Status: SHIPPED | OUTPATIENT
Start: 2024-08-02

## 2024-08-02 RX ORDER — VENLAFAXINE HYDROCHLORIDE 150 MG/1
CAPSULE, EXTENDED RELEASE ORAL
Qty: 270 CAPSULE | Refills: 3 | Status: SHIPPED | OUTPATIENT
Start: 2024-08-02

## 2024-08-02 RX ORDER — OMEPRAZOLE 10 MG/1
20 CAPSULE, DELAYED RELEASE ORAL
Qty: 120 CAPSULE | Refills: 11 | Status: SHIPPED | OUTPATIENT
Start: 2024-08-02 | End: 2025-08-02

## 2024-08-02 RX ORDER — GLIPIZIDE 10 MG/1
10 TABLET ORAL 2 TIMES DAILY WITH MEALS
Qty: 180 TABLET | Refills: 3 | Status: SHIPPED | OUTPATIENT
Start: 2024-08-02

## 2024-08-02 NOTE — PROGRESS NOTES
Chief complaint:  Physical exam,    new patient  1/2021    69-year-old white female with multiple medical problems.  She was seeing a PCP Dr. Shilpa Neal at [facility] but that particular vision was only in clinic one day per week and she would like better access.  It does appear however she has continued to make follow-up at [facility] over the past two years since I have seen her..  She prior did see Dr. Garcia who apparently had her on high dose of Effexor and she has been good with that.  Her A1c in June 2020 was 6.1 then  I see a 13 and then 9.1, 5.4, 5.8 .  I reviewed outside records.  She does continue on three medications for diabetes.       She has a history of colon polyps that I was able to find some access to and she is up-to-date.  11/19 scope -5 yrs .  After making her aware that her scope is due this year she says she will contact her GI to get that set up.    Apparently still seeing her prior PCP at [facility].  Last seen 6/24.       She was offered insulin that scared her so she improved her diet her A1cs improving.  She is on glipizide 10 daily we discussed going to twice daily watching for hypoglycemia.  She already was on an increased dose of Actos and I made that adjustment to her med list.  We discussed getting repeat labs before the end of the year as it will probably be continuing to improve.  She likes to go to [facility] for her labs.    Also reviewed her most recent labs with an LDL of 168.  Prior her LDL was below 130 but discussed that with diabetes would like her LDL below 100 and discussed the guidelines that recommend statin therapy for all diabetics.  She will consider but would like to try some dietary modification and recheck labs again to make sure this most recent LDL of 168 was not something in error    ALT up in 2022 then ok    Mammo 7/24 , looked up the report and looks like her last one was done at diagnostic imaging so I gave her a printed copy and a printed prescription to follow through on  getting that done outside the system.    BMD done -OP in       Colon  2 polyps -This is a 64 y.o. woman with PMH significant for NIDDM, HTN, OA, depression and anxiety, referred to [facility] for evaluation for colonoscopy. Says her last colonoscopy was about  and was normal per the patient      ROS:   CONST: weight stable. EYES: no vision change. ENT: no sore throat. CV: no chest pain w/ exertion. RESP: no shortness of breath. GI: no nausea, vomiting, diarrhea. No dysphagia. : no urinary issues. MUSCULOSKELETAL: no new myalgias or arthralgias. SKIN: no new changes. NEURO: no focal deficits. PSYCH: no new issues. ENDOCRINE: no polyuria. HEME: no lymph nodes. ALLERGY: no general pruritis.    Past medical history:  1.  Diabetes  2.  Depression, seen in the past by Dr. Garcia  3.  Colon polyps 2019 -5 yrs?  4.  Osteoarthritis, cortisone injections in both shoulders  5.  Hypertension  6.  OP     Past surgical history:  1.  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  Right Achilles tendon rupture and repair     Family history:  Father  at 82 of prostate cancer and diabetes.  Mother  at 60 from a drunk .  Brother  at 66 with diabetes.  1 brother living.    Social history:  Retired working for the [employer].   for six years with no previous marriage.  She has two children.  She does not smoke or drink.    Gen: no distress  EYES: conjunctiva clear, non-icteric, PERRL  ENT: nose clear, nasal mucosa normal, oropharynx clear and moist, teeth good  NECK:supple, thyroid non-palpable  RESP: effort is good, lungs clear  CV: heart RRR slight systolic murmur, gallops or rubs; no carotid bruits, trace edema  GI: abdomen soft, non-distended, non-tender, no hepatosplenomegaly  MS:  In wheelchair no clubbing or cyanosis of the digits  SKIN: no rashes, warm to touch      Available colonoscopy reports, labs, x-ray reports reviewed.  All  available records reviewed as was med list.    Diagnoses and all orders for this visit:    Routine medical exam, due for colonoscopy this year given history of polyps, up-to-date on mammogram, labs appear to be up-to-date but will reassess some in October and those orders were sent to [facility]    Type 2 diabetes mellitus without complication, without long-term current use of insulin, three-month A1c will be in October  -     Hemoglobin A1C; Future  -     Lipid Panel; Future  -     TSH; Future  -     Hemoglobin A1C  -     Lipid Panel  -     TSH    Encounter for screening mammogram for breast cancer, up-to-date    History of colonic polyps, due for five year scope    Essential hypertension, chronic and stable    Mood disorder, chronic and stable apparently    Overactive bladder, apparently seen urology and now on medications and saw Urogynecology and now has a pessary    Stage 3b chronic kidney disease, chronic and stable    Severe obesity (BMI 35.0-39.9) with comorbidity    Diabetes mellitus due to underlying condition with stage 3 chronic kidney disease, without long-term current use of insulin, unspecified whether stage 3a or 3b CKD    Other hyperlipidemia, discuss that she needs a statin but you would like to reassess 1st  -     Lipid Panel; Future  -     Lipid Panel    RUDY (stress urinary incontinence, female) now has a pessary and goes in every few months for a change    Frequent UTI, patient concerned that if she gets a UTI with her severe symptoms over the weekend she would like to potentially have some antibiotics on hand to treat.  Will send in three days of Bactrim as she reports she tolerated sulfa in the past.  Will encourage her if during working hours to call us with her symptoms and get a urinalysis and culture sent before antibiotics    Other orders  -     omeprazole (PRILOSEC) 10 MG capsule; Take 2 capsules (20 mg total) by mouth 2 (two) times daily before meals.  -     glipiZIDE (GLUCOTROL) 10 MG  tablet; Take 1 tablet (10 mg total) by mouth 2 (two) times daily with meals.  -     metFORMIN (GLUCOPHAGE) 500 MG tablet; 2 pills BID2 pills BID  -     pioglitazone (ACTOS) 45 MG tablet; Take 1 tablet (45 mg total) by mouth once daily.  -     venlafaxine (EFFEXOR-XR) 150 MG Cp24; Take  3 at night  -     sulfamethoxazole-trimethoprim 800-160mg (BACTRIM DS) 800-160 mg Tab; Take 1 tablet by mouth 2 (two) times daily. for 3 days

## 2024-09-20 ENCOUNTER — PATIENT OUTREACH (OUTPATIENT)
Dept: ADMINISTRATIVE | Facility: HOSPITAL | Age: 69
End: 2024-09-20
Payer: MEDICARE

## 2024-09-20 ENCOUNTER — TELEPHONE (OUTPATIENT)
Dept: ADMINISTRATIVE | Facility: HOSPITAL | Age: 69
End: 2024-09-20
Payer: MEDICARE

## 2024-09-20 DIAGNOSIS — Z11.59 NEED FOR HEPATITIS C SCREENING TEST: Primary | ICD-10-CM

## 2024-09-20 NOTE — TELEPHONE ENCOUNTER
Pt needs exclusion codes dropped this year or Statin prescribed and dispensed due to DM    Exclusion/ Intolerance Codes:  Myalgia (M79.10)  Myositis (M60.90)  Myopathy (G72.0, G72.2, G72.9)  Rhabdomyolysis (M62.82)  Hospice  Palliative Care  67 y/o & > Frailty & Advanced Illness   67 y/o & > Live in long-term institution or enrolled in institutional SNP for more 90 days    *Exclusions need to be documented annually as an active problem and dropped on a claim every year - starts over every January 1    *Frailty: need 2 indications of frailty on different dates during the year

## 2024-09-26 ENCOUNTER — OFFICE VISIT (OUTPATIENT)
Dept: UROGYNECOLOGY | Facility: CLINIC | Age: 69
End: 2024-09-26
Payer: COMMERCIAL

## 2024-09-26 VITALS
DIASTOLIC BLOOD PRESSURE: 77 MMHG | HEART RATE: 88 BPM | HEIGHT: 72 IN | SYSTOLIC BLOOD PRESSURE: 170 MMHG | BODY MASS INDEX: 43.06 KG/M2

## 2024-09-26 DIAGNOSIS — N81.6 RECTOCELE, FEMALE: ICD-10-CM

## 2024-09-26 DIAGNOSIS — N95.2 VAGINAL ATROPHY: ICD-10-CM

## 2024-09-26 DIAGNOSIS — N39.46 URINARY INCONTINENCE, MIXED: ICD-10-CM

## 2024-09-26 DIAGNOSIS — Z46.89 PESSARY MAINTENANCE: ICD-10-CM

## 2024-09-26 DIAGNOSIS — N81.11 CYSTOCELE, MIDLINE: Primary | ICD-10-CM

## 2024-09-26 PROCEDURE — 1126F AMNT PAIN NOTED NONE PRSNT: CPT | Mod: CPTII,S$GLB,, | Performed by: NURSE PRACTITIONER

## 2024-09-26 PROCEDURE — 3288F FALL RISK ASSESSMENT DOCD: CPT | Mod: CPTII,S$GLB,, | Performed by: NURSE PRACTITIONER

## 2024-09-26 PROCEDURE — 1101F PT FALLS ASSESS-DOCD LE1/YR: CPT | Mod: CPTII,S$GLB,, | Performed by: NURSE PRACTITIONER

## 2024-09-26 PROCEDURE — 99213 OFFICE O/P EST LOW 20 MIN: CPT | Mod: S$GLB,,, | Performed by: NURSE PRACTITIONER

## 2024-09-26 PROCEDURE — 3077F SYST BP >= 140 MM HG: CPT | Mod: CPTII,S$GLB,, | Performed by: NURSE PRACTITIONER

## 2024-09-26 PROCEDURE — 1160F RVW MEDS BY RX/DR IN RCRD: CPT | Mod: CPTII,S$GLB,, | Performed by: NURSE PRACTITIONER

## 2024-09-26 PROCEDURE — 3078F DIAST BP <80 MM HG: CPT | Mod: CPTII,S$GLB,, | Performed by: NURSE PRACTITIONER

## 2024-09-26 PROCEDURE — 1159F MED LIST DOCD IN RCRD: CPT | Mod: CPTII,S$GLB,, | Performed by: NURSE PRACTITIONER

## 2024-09-26 PROCEDURE — 99999 PR PBB SHADOW E&M-EST. PATIENT-LVL IV: CPT | Mod: PBBFAC,,, | Performed by: NURSE PRACTITIONER

## 2024-09-26 PROCEDURE — 3008F BODY MASS INDEX DOCD: CPT | Mod: CPTII,S$GLB,, | Performed by: NURSE PRACTITIONER

## 2024-09-26 NOTE — PROGRESS NOTES
Urogyn follow up  09/26/2024  .  [facility] - UROGYNECOLOGY  [address] [id number]    Sandi Dobson  [id number]  1955      Sandi Dobson is a 69 y.o. here for a urogyn follow up of mixed incontinence and pessary check     Last HPI from 10/20/2022     1)  UI:  (+) RUDY (bend, cough, sneeze) = (+) UUI  X years, worsening. Taking mirabegron 25 mg -- unsure if helps. Tried PFPT ([place]) in 2020.    (--) pads--should be. Sleeps in lazy boy recliner, has a dog pad underneath her--has a few drops of urine. Also has pad in wheelchair. Leaks with transferring.  Daytime frequency: Q 2 hours. Tries to hold at least that.   Nocturia: Yes: 1/night.   (--) dysuria,  (--) hematuria,  (+) reported frequent UTIs. Sees PCP--sends in ABX/pyridium without testing. Not sure C&S are sent.  (--) complete bladder emptying. +PV urgency/hesitancy. DV with small 2nd volume.      --9/2022 [facility] ([facility]):  presents for follow up with acute vulvitis, stress urinary incontinence and overactive bladder.. Patient is being currently treated for chronic and acute vulvitis and vulvar pruritus. She has been using RO soaks and topical steroids. She noticed only mild improvement and continues to have itching and scratching.    And also presents for follow-up of her urodynamics performed on August 22nd. The study revealed a normally sensate bladder of normal capacity and compliance. She did exhibit stress urinary incontinence at a fill of 392 cc with Valsalva leak point pressures of 27 and 34. She voids by detrusor contraction to completion with a postvoid residual of 60 cc. The bladder was stable throughout there was no evidence of DO or DI.      1. Overactive bladder  Reviewed urodynamics study with the patient which failed to reveal detrusor overactivity or detrusor overactivity incontinence.    2. 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 We discussed treatment options including possible mid urethral sling with bulking or staged approach with bulking later. Reviewed the nature and technique of the procedure, recovery, and potential risks and complications. Recommend a staged approach to avoid urinary retention. Patient is given literature on mid urethral sling and on bulking procedure.    3. Vulvar pruritus  Patient will continue to use the birth soaks in the topical hydrocortisone as well as her Vaseline mildly it barrier an effort to remove the condition of the skin of the vulva perianal area and upper thighs. We did discuss with her avoiding scratching the areas I will only incur further itching. She is also using some cold packs to help reduce the sensation. She is return to the office in 1 month to recheck vulva and we consider her treatment options for stress urinary incontinence.     2)  POP:  Absent.  (--) vaginal bleeding. (--) vaginal discharge. (--) sexually active.  (--) dyspareunia.  (+)  Vaginal dryness.  (--) vaginal estrogen use.   --POP-Q:  Aa -1; Ba -1; C -5; Ap -1; Bp -; D -6.  Genital hiatus 4, perineal body 2, total vaginal length 10-11.    3)  BM:  (--) constipation/straining.  (--) chronic diarrhea. (--) hematochezia.  (--) fecal incontinence.  (--) fecal smearing/urgency.  (+) complete evacuation.      4)  Vulvar irritation:  --using hydrocortisone cream daily  --scratches a lot  --uses ice packs  --using domeboro (aluminum and calcium) solution to soak  --has tried vaseline and EVVO  --thinks this is from staying wet with urine    05/26/2023  1)  Mixed urinary incontinence, urge < stress:    --denies UI  --voiding every 6 hours  --voiding 1/ night  --taking mirabegron    2)vaginal atrophy  -- using vaginal estrogen cream twice weekly     3)midline cystocele/ rectocele  --using pessary  --denies pain, bleeding, or  discharge    4)vulvar itching  -- 12/2021 hgba1c 7    09/21/2023  Taking myrbetriq  Has had more UI over the past few weeks  Using vaginal estrogen cream    06/27/2023  Stopped myrbetriq 4 weeks ago-- voiding every 5-6 hours during the day. Denies UI  Denies bleeding or discharge  Denies constipation  Using vaginal estrogen cream    Changes since last visit  Rare RUDY.  Voiding every 4 hours during the day -- if not will have mild UUI  Denies bleeding or discharge-- not using vaginal estrogen cream  Denies constipation         Past Medical History:   Diagnosis Date    History of colonic polyps 01/29/2021 11/19 [facility] - Findings: 2 sessile polyps hepatic flexure and rectosigmoid      Hypertension     Mood disorder 01/29/2021    Uncontrolled type 2 diabetes mellitus with hyperglycemia 01/29/2021   --B knee OA: uses wheelchair; has trouble straightening knees; is not able to walk much at all     --mood disorder: h/o situational depression (mother was killed by drunk )--resolved; followed by psych ([facility]); takes effexor XR; currently stable     --DM2:    Lab Results   Component Value Date    HGBA1C 5.4 06/21/2023   --has been as high as 13%+ (could not have cataract surgery 8/2022)--now 7 on oral agents; no secondary disease; ? Mild B foot neuropathy    History reviewed. No pertinent surgical history.  Xlap/Pfannenstiel for removal of benign/congenital tumor--L fallopian tube was looped around thisc    Family History   Problem Relation Name Age of Onset    Vision loss Mother      Diabetes Father      Cancer Father      Hearing loss Maternal Aunt      Hearing loss Maternal Grandmother      Stroke Paternal Grandmother         Social History     Socioeconomic History    Marital status:    Tobacco Use    Smoking status: Never    Smokeless tobacco: Never   Substance and Sexual Activity    Drug use: Not Currently     Social Drivers of Health     Financial Resource Strain: Low Risk  (7/14/2022)    Received from  [employer]    Overall Financial Resource Strain (CARDIA)     Difficulty of Paying Living Expenses: Not hard at all   Food Insecurity: No Food Insecurity (7/14/2022)    Received from [employer] [facility]    Hunger Vital Sign     Worried About Running Out of Food in the Last Year: Never true     Ran Out of Food in the Last Year: Never true   Transportation Needs: No Transportation Needs (7/14/2022)    Received from [facility] [employer] [facility]    PRAPARE - Transportation     Lack of Transportation (Medical): No     Lack of Transportation (Non-Medical): No   Physical Activity: Unknown (7/14/2022)    Received from [facility] [employer] [facility]    Exercise Vital Sign     Days of Exercise per Week: 0 days   Stress: No Stress Concern Present (7/14/2022)    Received from [employer] [facility]    [employer] [employer] - Occupational Stress Questionnaire     Feeling of Stress : Not at all   Housing Stability: Low Risk  (7/14/2022)    Received from [facility] [employer] [facility]    Housing Stability Vital Sign     Unable to Pay for Housing in the Last Year: No     Number of Places Lived in the Last Year: 1     Unstable Housing in the Last Year: No       Current Outpatient Medications   Medication Sig Dispense Refill    ALPRAZolam (XANAX) 1 MG tablet Take 1 mg by mouth 3 (three) times daily as needed.      estradioL (ESTRACE) 0.01 % (0.1 mg/gram) vaginal cream PLACE 1 G VAGINALLY TWICE A WEEK. APPLY DIME-SIZED AMOUNT    WITH FINGER IN             VAGINA/AROUND EXTERNAL     PARTS NIGHTLY. 42.5 g 0    ferrous fumarate-vitamin C 200 mg (65 mg iron)-25 mg TbSR Take by mouth.      glipiZIDE (GLUCOTROL) 10 MG tablet Take 1 tablet (10 mg total) by mouth 2 (two) times daily with meals. 180 tablet 3    metFORMIN (GLUCOPHAGE) 500 MG tablet 2 pills BID2 pills  tablet 3    omeprazole (PRILOSEC) 10 MG capsule Take 2 capsules (20 mg total) by mouth 2 (two) times daily before meals. 120 capsule 11     pioglitazone (ACTOS) 45 MG tablet Take 1 tablet (45 mg total) by mouth once daily. 90 tablet 3    venlafaxine (EFFEXOR-XR) 150 MG Cp24 Take  3 at night 270 capsule 3    vibegron (GEMTESA) 75 mg Tab Take 1 tablet (75 mg total) by mouth once daily. 30 tablet 11    oxybutynin (DITROPAN-XL) 10 MG 24 hr tablet Take 1 tablet by mouth once daily.       No current facility-administered medications for this visit.       Review of patient's allergies indicates:  No Known Allergies    Well woman:  Pap test:09/2023 normal HPV negative  10/2022 normal hpv 16, other high risk positive  Colpo: Cxbx/ecc  1. CERVICAL BIOPSY AT 6 O'CLOCK SHOWS SMALL DETACHED FRAGMENTS OF SQUAMOUS   EPITHELIUM WITH MILD DYSPLASIA (CIN1) AND INFLAMMATION.   2. ENDOCERVICAL CURETTAGE SHOWS DETACHED FRAGMENTS OF SQUAMOUS EPITHELIUM   MILD DYSPLASIA AND KOILOCYTOTIC ATYPIA (CIN1).   09/2023 normal hpv negative History of abnormal paps: No.  History of STIs:  No  Mammogram: 07/2024 normal  Colonoscopy: Date of last: 2019.  Result:  Findings: 2 sessile polyps hepatic flexure and rectosigmoid.  Repeat due:  2029.    DEXA:  needs to schedule    ROS:  As per HPI.      Exam  BP (!) 170/77 (BP Location: Right forearm, Patient Position: Sitting, BP Method: Medium (Automatic))   Pulse 88   Ht 6' (1.829 m)   LMP  (LMP Unknown)   BMI 43.06 kg/m²   General: alert and oriented, no acute distress  Respiratory: normal respiratory effort  Abd: soft, non-tender, non-distended; +rectus diastasis, +umbilical hernia      Pelvic  Ext. Genitalia: normal external genitalia. Normal bartholin's and skeens glands.  Mild irritation in skin folds  Vagina: + atrophy. Normal vaginal mucosa without lesions. No discharge noted.   Non-tender bladder base without palpable mass.  #3 ring with support pessary in place  Cervix: no lesions  Uterus:  uterus is normal size, shape, consistency and nontender   Urethra: no masses or tenderness  Urethral meatus: no lesions, caruncle or  prolapse.      Pessary removed without difficulty. Washed with soap and water. Reinserted without difficulty.     Impression  1. Cystocele, midline        2. Urinary incontinence, mixed        3. Rectocele, female        4. Pessary maintenance        5. Vaginal atrophy                We reviewed the above issues and discussed options for short-term versus long-term management of her problems.   Plan:   1)  Vulvar irritation:  --MOSTLY resolved at this time  --nystatin cream twice daily as needed  --use estrogen cream on outer labia     2)  Mixed urinary incontinence, urge < stress:    --continue to control diabetes: A1c 5.5%  --continue to work on weight loss  --Empty bladder every 2-3 hours.  Empty well: wait a minute, lean forward on toilet.    --Avoid dietary irritants (see sheet).  Keep diary x 3-5 days to determine your irritants.  --could not start pelvic floor PT. [facility] location is closed. Has cataracts and trouble driving.   --Hold off for now  --URGE trial gemtesa 75 mg daily  ---STRESS:  Pessary vs. Sling                      --need recheck for anterior prolapse before any surgery  --urine culture  --urine micro            3) cystocele/ rectocele  --for now continue #3 ring with support pessary  --consider surgical correction (A&P repair if possible since uterus seems supported) in future   --if uterus not well-supported, could do TVH--high risk if need to convert to LSC or open   --need to repeat pap/HPV in 12/2023    --if still +, could we do LEEP instead? (Will ask ONC)   --need to do office cystometry to see if need sling    --make sure A1c <7.5%   --need to get clearance per PCP + labs (A1c, CBC, CMP, T&S)/EKG    4) well-woman:  --10/2022 pap normal/HPV 16+  --12/2022 colposcopy  Colposcopy Site:  Cervix  Position:  Supine  Acrowhite Lesion: No    Atypical Vessels: No    Transformation Zone Adequate?: No    Biopsy?: Yes         Location:  Cervix ((6 00))  ECC Performed?: Yes (tenaculum used to hold  cervix still)    LEEP Performed?: No    Estimated blood loss (cc):  0  1. CERVICAL BIOPSY AT 6 O'CLOCK SHOWS SMALL DETACHED FRAGMENTS OF SQUAMOUS   EPITHELIUM WITH MILD DYSPLASIA (CIN1) AND INFLAMMATION.   2. ENDOCERVICAL CURETTAGE SHOWS DETACHED FRAGMENTS OF SQUAMOUS EPITHELIUM   MILD DYSPLASIA AND KOILOCYTOTIC ATYPIA (CIN1).   --continue to control diabetes  --repeat pap/HPV normal     4)RTC for pessary check in 3 months    I spent a total of 20 minutes on the day of the visit.  This includes face to face time and non-face to face time preparing to see the patient (eg, review of tests), obtaining and/or reviewing separately obtained history, documenting clinical information in the electronic or other health record, independently interpreting results and communicating results to the patient/family/caregiver, or care coordinator.     Lizzeth Schmidt, JASON-BC  [facility]  Division of Female Pelvic Medicine and Reconstructive Surgery  Department of Obstetrics & Gynecology

## 2024-09-26 NOTE — PATIENT INSTRUCTIONS
1)  Vulvar irritation:  --MOSTLY resolved at this time  --nystatin cream twice daily as needed  --use estrogen cream on outer labia     2)  Mixed urinary incontinence, urge < stress:    --continue to control diabetes: A1c 5.5%  --continue to work on weight loss  --Empty bladder every 2-3 hours.  Empty well: wait a minute, lean forward on toilet.    --Avoid dietary irritants (see sheet).  Keep diary x 3-5 days to determine your irritants.  --could not start pelvic floor PT. [facility] location is closed. Has cataracts and trouble driving.   --Hold off for now  --URGE trial gemtesa 75 mg daily  ---STRESS:  Pessary vs. Sling                      --need recheck for anterior prolapse before any surgery  --urine culture  --urine micro            3) cystocele/ rectocele  --for now continue #3 ring with support pessary  --consider surgical correction (A&P repair if possible since uterus seems supported) in future   --if uterus not well-supported, could do TVH--high risk if need to convert to LSC or open   --need to repeat pap/HPV in 12/2023    --if still +, could we do LEEP instead? (Will ask ONC)   --need to do office cystometry to see if need sling    --make sure A1c <7.5%   --need to get clearance per PCP + labs (A1c, CBC, CMP, T&S)/EKG    4) well-woman:  --10/2022 pap normal/HPV 16+  --12/2022 colposcopy  Colposcopy Site:  Cervix  Position:  Supine  Acrowhite Lesion: No    Atypical Vessels: No    Transformation Zone Adequate?: No    Biopsy?: Yes         Location:  Cervix ((6 00))  ECC Performed?: Yes (tenaculum used to hold cervix still)    LEEP Performed?: No    Estimated blood loss (cc):  0  1. CERVICAL BIOPSY AT 6 O'CLOCK SHOWS SMALL DETACHED FRAGMENTS OF SQUAMOUS   EPITHELIUM WITH MILD DYSPLASIA (CIN1) AND INFLAMMATION.   2. ENDOCERVICAL CURETTAGE SHOWS DETACHED FRAGMENTS OF SQUAMOUS EPITHELIUM   MILD DYSPLASIA AND KOILOCYTOTIC ATYPIA (CIN1).   --continue to control diabetes  --repeat pap/HPV normal     4)RTC for  pessary check in 3 months

## 2024-12-26 ENCOUNTER — OFFICE VISIT (OUTPATIENT)
Dept: UROGYNECOLOGY | Facility: CLINIC | Age: 69
End: 2024-12-26
Payer: COMMERCIAL

## 2024-12-26 VITALS
SYSTOLIC BLOOD PRESSURE: 152 MMHG | HEIGHT: 72 IN | HEART RATE: 83 BPM | BODY MASS INDEX: 43.06 KG/M2 | DIASTOLIC BLOOD PRESSURE: 74 MMHG

## 2024-12-26 DIAGNOSIS — N39.41 URGE INCONTINENCE: Primary | ICD-10-CM

## 2024-12-26 DIAGNOSIS — N81.11 CYSTOCELE, MIDLINE: ICD-10-CM

## 2024-12-26 DIAGNOSIS — Z46.89 PESSARY MAINTENANCE: ICD-10-CM

## 2024-12-26 DIAGNOSIS — N81.6 RECTOCELE, FEMALE: ICD-10-CM

## 2024-12-26 DIAGNOSIS — N39.46 URINARY INCONTINENCE, MIXED: ICD-10-CM

## 2024-12-26 DIAGNOSIS — N95.2 VAGINAL ATROPHY: ICD-10-CM

## 2024-12-26 PROCEDURE — 99999 PR PBB SHADOW E&M-EST. PATIENT-LVL IV: CPT | Mod: PBBFAC,,, | Performed by: NURSE PRACTITIONER

## 2024-12-26 RX ORDER — VIBEGRON 75 MG/1
75 TABLET, FILM COATED ORAL DAILY
Qty: 30 TABLET | Refills: 11 | Status: SHIPPED | OUTPATIENT
Start: 2024-12-26

## 2024-12-26 NOTE — PROGRESS NOTES
Urogyn follow up  12/26/2024  .  [facility] - UROGYNECOLOGY  [address] [id number]    Sandi Dobson  [id number]  1955      Sandi Dobson is a 69 y.o. here for a urogyn follow up of mixed incontinence and pessary check     Last HPI from 10/20/2022     1)  UI:  (+) RUDY (bend, cough, sneeze) = (+) UUI  X years, worsening. Taking mirabegron 25 mg -- unsure if helps. Tried PFPT ([place]) in 2020.    (--) pads--should be. Sleeps in lazy boy recliner, has a dog pad underneath her--has a few drops of urine. Also has pad in wheelchair. Leaks with transferring.  Daytime frequency: Q 2 hours. Tries to hold at least that.   Nocturia: Yes: 1/night.   (--) dysuria,  (--) hematuria,  (+) reported frequent UTIs. Sees PCP--sends in ABX/pyridium without testing. Not sure C&S are sent.  (--) complete bladder emptying. +PV urgency/hesitancy. DV with small 2nd volume.      --9/2022 [facility] [place] ([facility]):  presents for follow up with acute vulvitis, stress urinary incontinence and overactive bladder.. Patient is being currently treated for chronic and acute vulvitis and vulvar pruritus. She has been using RO soaks and topical steroids. She noticed only mild improvement and continues to have itching and scratching.    And also presents for follow-up of her urodynamics performed on August 22nd. The study revealed a normally sensate bladder of normal capacity and compliance. She did exhibit stress urinary incontinence at a fill of 392 cc with Valsalva leak point pressures of 27 and 34. She voids by detrusor contraction to completion with a postvoid residual of 60 cc. The bladder was stable throughout there was no evidence of DO or DI.      1. Overactive bladder  Reviewed urodynamics study with the patient which failed to reveal detrusor overactivity or detrusor overactivity incontinence.    2. 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 We discussed treatment options including possible mid urethral sling with bulking or staged approach with bulking later. Reviewed the nature and technique of the procedure, recovery, and potential risks and complications. Recommend a staged approach to avoid urinary retention. Patient is given literature on mid urethral sling and on bulking procedure.    3. Vulvar pruritus  Patient will continue to use the birth soaks in the topical hydrocortisone as well as her Vaseline mildly it barrier an effort to remove the condition of the skin of the vulva perianal area and upper thighs. We did discuss with her avoiding scratching the areas I will only incur further itching. She is also using some cold packs to help reduce the sensation. She is return to the office in 1 month to recheck vulva and we consider her treatment options for stress urinary incontinence.     2)  POP:  Absent.  (--) vaginal bleeding. (--) vaginal discharge. (--) sexually active.  (--) dyspareunia.  (+)  Vaginal dryness.  (--) vaginal estrogen use.   --POP-Q:  Aa -1; Ba -1; C -5; Ap -1; Bp -; D -6.  Genital hiatus 4, perineal body 2, total vaginal length 10-11.    3)  BM:  (--) constipation/straining.  (--) chronic diarrhea. (--) hematochezia.  (--) fecal incontinence.  (--) fecal smearing/urgency.  (+) complete evacuation.      4)  Vulvar irritation:  --using hydrocortisone cream daily  --scratches a lot  --uses ice packs  --using domeboro (aluminum and calcium) solution to soak  --has tried vaseline and EVVO  --thinks this is from staying wet with urine    05/26/2023  1)  Mixed urinary incontinence, urge < stress:    --denies UI  --voiding every 6 hours  --voiding 1/ night  --taking mirabegron    2)vaginal atrophy  -- using vaginal estrogen cream twice weekly     3)midline cystocele/ rectocele  --using pessary  --denies pain, bleeding, or  discharge    4)vulvar itching  -- 12/2021 hgba1c 7    09/21/2023  Taking myrbetriq  Has had more UI over the past few weeks  Using vaginal estrogen cream    06/27/2023  Stopped myrbetriq 4 weeks ago-- voiding every 5-6 hours during the day. Denies UI  Denies bleeding or discharge  Denies constipation  Using vaginal estrogen cream    Changes since last visit (last visit 09/26/2024)  Rare RUDY.  Voiding every 4 hours during the day -- if not will have mild UUI.  Would like to restart gemtesa  Denies bleeding or discharge-- not using vaginal estrogen cream  Denies constipation         Past Medical History:   Diagnosis Date    History of colonic polyps 01/29/2021 11/19 [facility] - Findings: 2 sessile polyps hepatic flexure and rectosigmoid      Hypertension     Mood disorder 01/29/2021    Uncontrolled type 2 diabetes mellitus with hyperglycemia 01/29/2021   --B knee OA: uses wheelchair; has trouble straightening knees; is not able to walk much at all     --mood disorder: h/o situational depression (mother was killed by drunk )--resolved; followed by psych (Radha); takes effexor XR; currently stable     --DM2:    Lab Results   Component Value Date    HGBA1C 5.8 (H) 06/26/2024   --has been as high as 13%+ (could not have cataract surgery 8/2022)--now 7 on oral agents; no secondary disease; ? Mild B foot neuropathy    History reviewed. No pertinent surgical history.  Xlap/Pfannenstiel for removal of benign/congenital tumor--L fallopian tube was looped around thisc    Family History   Problem Relation Name Age of Onset    Vision loss Mother      Diabetes Father      Cancer Father      Hearing loss Maternal Aunt      Hearing loss Maternal Grandmother      Stroke Paternal Grandmother         Social History     Socioeconomic History    Marital status:    Tobacco Use    Smoking status: Never    Smokeless tobacco: Never   Substance and Sexual Activity    Drug use: Not Currently     Social Drivers of Health      Financial Resource Strain: Low Risk  (7/14/2022)    Received from [employer] [facility] [place] [facility]    Overall Financial Resource Strain (CARDIA)     Difficulty of Paying Living Expenses: Not hard at all   Food Insecurity: No Food Insecurity (7/14/2022)    Received from [facility] [place] [facility] [employer] [facility] [place] [facility]    Hunger Vital Sign     Worried About Running Out of Food in the Last Year: Never true     Ran Out of Food in the Last Year: Never true   Transportation Needs: No Transportation Needs (7/14/2022)    Received from [employer] [facility] [place] [facility]    PRAPARE - Transportation     Lack of Transportation (Medical): No     Lack of Transportation (Non-Medical): No   Physical Activity: Unknown (7/14/2022)    Received from [employer] [facility] [place] [facility]    Exercise Vital Sign     Days of Exercise per Week: 0 days   Stress: No Stress Concern Present (7/14/2022)    Received from [employer] [facility] [place] [facility]    [employer] - Occupational Stress Questionnaire     Feeling of Stress : Not at all   Housing Stability: Low Risk  (7/14/2022)    Received from [employer] [facility] [place] [employer]    Housing Stability Vital Sign     Unable to Pay for Housing in the Last Year: No     Number of Places Lived in the Last Year: 1     Unstable Housing in the Last Year: No       Current Outpatient Medications   Medication Sig Dispense Refill    estradioL (ESTRACE) 0.01 % (0.1 mg/gram) vaginal cream PLACE 1 G VAGINALLY TWICE A WEEK. APPLY DIME-SIZED AMOUNT    WITH FINGER IN             VAGINA/AROUND EXTERNAL     PARTS NIGHTLY. 42.5 g 0    ferrous fumarate-vitamin C 200 mg (65 mg iron)-25 mg TbSR Take by mouth.      glipiZIDE (GLUCOTROL) 10 MG tablet Take 1 tablet (10 mg total) by mouth 2 (two) times daily with meals. 180 tablet 3    metFORMIN (GLUCOPHAGE) 500 MG tablet 2 pills BID2 pills  tablet 3    omeprazole (PRILOSEC) 10 MG capsule Take 2 capsules (20 mg total) by mouth 2 (two) times daily before meals. 120 capsule 11    pioglitazone (ACTOS) 45 MG  tablet Take 1 tablet (45 mg total) by mouth once daily. 90 tablet 3    venlafaxine (EFFEXOR-XR) 150 MG Cp24 Take  3 at night 270 capsule 3    vibegron (GEMTESA) 75 mg Tab Take 1 tablet (75 mg total) by mouth once daily. 30 tablet 11    ALPRAZolam (XANAX) 1 MG tablet Take 1 mg by mouth 3 (three) times daily as needed.      vibegron (GEMTESA) 75 mg Tab Take 1 tablet (75 mg total) by mouth once daily. 30 tablet 11     No current facility-administered medications for this visit.       Review of patient's allergies indicates:  No Known Allergies    Well woman:  Pap test:  09/2023 normal HPV negative  10/2022 normal hpv 16, other high risk positive  Colpo: Cxbx/ecc  1. CERVICAL BIOPSY AT 6 O'CLOCK SHOWS SMALL DETACHED FRAGMENTS OF SQUAMOUS   EPITHELIUM WITH MILD DYSPLASIA (CIN1) AND INFLAMMATION.   2. ENDOCERVICAL CURETTAGE SHOWS DETACHED FRAGMENTS OF SQUAMOUS EPITHELIUM   MILD DYSPLASIA AND KOILOCYTOTIC ATYPIA (CIN1).   09/2023 normal hpv negative History of abnormal paps: No.  History of STIs:  No  Mammogram: 07/2024 normal  Colonoscopy: Date of last: 2019.  Result:  Findings: 2 sessile polyps hepatic flexure and rectosigmoid.  Repeat due:  2029.    DEXA:  needs to schedule    ROS:  As per HPI.      Exam  BP (!) 152/74 (BP Location: Right arm, Patient Position: Sitting)   Pulse 83   Ht 6' (1.829 m)   LMP  (LMP Unknown)   BMI 43.06 kg/m²   General: alert and oriented, no acute distress  Respiratory: normal respiratory effort  Abd: soft, non-tender, non-distended; +rectus diastasis, +umbilical hernia      Pelvic  Ext. Genitalia: normal external genitalia. Normal bartholin's and skeens glands.  Mild irritation in skin folds  Vagina: + atrophy. Normal vaginal mucosa without lesions. No discharge noted.   Non-tender bladder base without palpable mass.  #3 ring with support pessary in place  Cervix: no lesions  Uterus:  uterus is normal size, shape, consistency and nontender   Urethra: no masses or tenderness  Urethral  meatus: no lesions, caruncle or prolapse.      Pessary removed without difficulty. Washed with soap and water. Reinserted without difficulty.     Impression  1. Urge incontinence  vibegron (GEMTESA) 75 mg Tab      2. Cystocele, midline        3. Rectocele, female        4. Urinary incontinence, mixed        5. Vaginal atrophy        6. Pessary maintenance                  We reviewed the above issues and discussed options for short-term versus long-term management of her problems.   Plan:   1)  Vulvar irritation:  --MOSTLY resolved at this time  --nystatin cream twice daily as needed  --use estrogen cream on outer labia     2)  Mixed urinary incontinence, urge < stress:    --continue to control diabetes: A1c 5.5%  --continue to work on weight loss  --Empty bladder every 2-3 hours.  Empty well: wait a minute, lean forward on toilet.    --Avoid dietary irritants (see sheet).  Keep diary x 3-5 days to determine your irritants.  --could not start pelvic floor PT. [facility] [place]. Has cataracts and trouble driving.   --Hold off for now  --URGE trial gemtesa 75 mg daily  ---STRESS:  Pessary vs. Sling                      --need recheck for anterior prolapse before any surgery  --urine culture  --urine micro            3) cystocele/ rectocele  --for now continue #3 ring with support pessary  --consider surgical correction (A&P repair if possible since uterus seems supported) in future   --if uterus not well-supported, could do TVH--high risk if need to convert to LSC or open   --need to repeat pap/HPV in 12/2023    --if still +, could we do LEEP instead? (Will ask ONC)   --need to do office cystometry to see if need sling    --make sure A1c <7.5%   --need to get clearance per PCP + labs (A1c, CBC, CMP, T&S)/EKG    4) well-woman:  --10/2022 pap normal/HPV 16+  --12/2022 colposcopy  Colposcopy Site:  Cervix  Position:  Supine  Acrowhite Lesion: No    Atypical Vessels: No    Transformation Zone Adequate?: No     Biopsy?: Yes         Location:  Cervix ((6 00))  ECC Performed?: Yes (tenaculum used to hold cervix still)    LEEP Performed?: No    Estimated blood loss (cc):  0  1. CERVICAL BIOPSY AT 6 O'CLOCK SHOWS SMALL DETACHED FRAGMENTS OF SQUAMOUS   EPITHELIUM WITH MILD DYSPLASIA (CIN1) AND INFLAMMATION.   2. ENDOCERVICAL CURETTAGE SHOWS DETACHED FRAGMENTS OF SQUAMOUS EPITHELIUM   MILD DYSPLASIA AND KOILOCYTOTIC ATYPIA (CIN1).   --continue to control diabetes  --repeat pap/HPV normal     4)RTC for pessary check in 3 months    I spent a total of 20 minutes on the day of the visit.  This includes face to face time and non-face to face time preparing to see the patient (eg, review of tests), obtaining and/or reviewing separately obtained history, documenting clinical information in the electronic or other health record, independently interpreting results and communicating results to the patient/family/caregiver, or care coordinator.     Lizzeth Schmidt, BRETTP-BC  [facility]  Division of Female Pelvic Medicine and Reconstructive Surgery  Department of Obstetrics & Gynecology

## 2025-02-21 DIAGNOSIS — Z00.00 ENCOUNTER FOR MEDICARE ANNUAL WELLNESS EXAM: ICD-10-CM

## 2025-03-31 ENCOUNTER — OFFICE VISIT (OUTPATIENT)
Dept: UROGYNECOLOGY | Facility: CLINIC | Age: 70
End: 2025-03-31
Payer: COMMERCIAL

## 2025-03-31 VITALS
HEIGHT: 72 IN | BODY MASS INDEX: 39.68 KG/M2 | WEIGHT: 293 LBS | DIASTOLIC BLOOD PRESSURE: 76 MMHG | SYSTOLIC BLOOD PRESSURE: 125 MMHG | HEART RATE: 79 BPM

## 2025-03-31 DIAGNOSIS — N81.6 RECTOCELE, FEMALE: ICD-10-CM

## 2025-03-31 DIAGNOSIS — Z46.89 PESSARY MAINTENANCE: ICD-10-CM

## 2025-03-31 DIAGNOSIS — N95.2 VAGINAL ATROPHY: ICD-10-CM

## 2025-03-31 DIAGNOSIS — N39.41 URGE INCONTINENCE: Primary | ICD-10-CM

## 2025-03-31 DIAGNOSIS — N81.11 CYSTOCELE, MIDLINE: ICD-10-CM

## 2025-03-31 PROCEDURE — 3008F BODY MASS INDEX DOCD: CPT | Mod: CPTII,S$GLB,, | Performed by: NURSE PRACTITIONER

## 2025-03-31 PROCEDURE — 1160F RVW MEDS BY RX/DR IN RCRD: CPT | Mod: CPTII,S$GLB,, | Performed by: NURSE PRACTITIONER

## 2025-03-31 PROCEDURE — 3288F FALL RISK ASSESSMENT DOCD: CPT | Mod: CPTII,S$GLB,, | Performed by: NURSE PRACTITIONER

## 2025-03-31 PROCEDURE — 1101F PT FALLS ASSESS-DOCD LE1/YR: CPT | Mod: CPTII,S$GLB,, | Performed by: NURSE PRACTITIONER

## 2025-03-31 PROCEDURE — 87086 URINE CULTURE/COLONY COUNT: CPT | Performed by: NURSE PRACTITIONER

## 2025-03-31 PROCEDURE — 3074F SYST BP LT 130 MM HG: CPT | Mod: CPTII,S$GLB,, | Performed by: NURSE PRACTITIONER

## 2025-03-31 PROCEDURE — 1159F MED LIST DOCD IN RCRD: CPT | Mod: CPTII,S$GLB,, | Performed by: NURSE PRACTITIONER

## 2025-03-31 PROCEDURE — 99213 OFFICE O/P EST LOW 20 MIN: CPT | Mod: 25,S$GLB,, | Performed by: NURSE PRACTITIONER

## 2025-03-31 PROCEDURE — 3078F DIAST BP <80 MM HG: CPT | Mod: CPTII,S$GLB,, | Performed by: NURSE PRACTITIONER

## 2025-03-31 PROCEDURE — 3051F HG A1C>EQUAL 7.0%<8.0%: CPT | Mod: CPTII,S$GLB,, | Performed by: NURSE PRACTITIONER

## 2025-03-31 PROCEDURE — 99999 PR PBB SHADOW E&M-EST. PATIENT-LVL IV: CPT | Mod: PBBFAC,,, | Performed by: NURSE PRACTITIONER

## 2025-03-31 PROCEDURE — 51701 INSERT BLADDER CATHETER: CPT | Mod: S$GLB,,, | Performed by: NURSE PRACTITIONER

## 2025-03-31 RX ORDER — ESTRADIOL 0.1 MG/G
1 CREAM VAGINAL
Qty: 42.5 G | Refills: 4 | Status: SHIPPED | OUTPATIENT
Start: 2025-03-31 | End: 2026-03-30

## 2025-03-31 RX ORDER — VIBEGRON 75 MG/1
75 TABLET, FILM COATED ORAL DAILY
Qty: 90 TABLET | Refills: 4 | Status: SHIPPED | OUTPATIENT
Start: 2025-03-31

## 2025-03-31 RX ORDER — PHENAZOPYRIDINE HYDROCHLORIDE 200 MG/1
200 TABLET, FILM COATED ORAL 3 TIMES DAILY PRN
Qty: 15 TABLET | Refills: 0 | Status: SHIPPED | OUTPATIENT
Start: 2025-03-31 | End: 2025-04-05

## 2025-03-31 NOTE — PROGRESS NOTES
Urogyn follow up  03/31/2025  .  [facility], [place] - UROGYNECOLOGY  [address] [address]    Sandi Dobson  [id number]  1955      Sandi Dobson is a 69 y.o. here for a urogyn follow up of mixed incontinence and pessary check     Last HPI from 10/20/2022     1)  UI:  (+) RUDY (bend, cough, sneeze) = (+) UUI  X years, worsening. Taking mirabegron 25 mg -- unsure if helps. Tried PFPT ([place]) in 2020.    (--) pads--should be. Sleeps in lazy boy recliner, has a dog pad underneath her--has a few drops of urine. Also has pad in wheelchair. Leaks with transferring.  Daytime frequency: Q 2 hours. Tries to hold at least that.   Nocturia: Yes: 1/night.   (--) dysuria,  (--) hematuria,  (+) reported frequent UTIs. Sees PCP--sends in ABX/pyridium without testing. Not sure C&S are sent.  (--) complete bladder emptying. +PV urgency/hesitancy. DV with small 2nd volume.      --9/2022 [facility] ([facility]):  presents for follow up with acute vulvitis, stress urinary incontinence and overactive bladder.. Patient is being currently treated for chronic and acute vulvitis and vulvar pruritus. She has been using RO soaks and topical steroids. She noticed only mild improvement and continues to have itching and scratching.    And also presents for follow-up of her urodynamics performed on August 22nd. The study revealed a normally sensate bladder of normal capacity and compliance. She did exhibit stress urinary incontinence at a fill of 392 cc with Valsalva leak point pressures of 27 and 34. She voids by detrusor contraction to completion with a postvoid residual of 60 cc. The bladder was stable throughout there was no evidence of DO or DI.      1. Overactive bladder  Reviewed urodynamics study with the patient which failed to reveal detrusor overactivity or detrusor overactivity incontinence.    2. 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 We discussed treatment options including possible mid urethral sling with bulking or staged approach with bulking later. Reviewed the nature and technique of the procedure, recovery, and potential risks and complications. Recommend a staged approach to avoid urinary retention. Patient is given literature on mid urethral sling and on bulking procedure.    3. Vulvar pruritus  Patient will continue to use the birth soaks in the topical hydrocortisone as well as her Vaseline mildly it barrier an effort to remove the condition of the skin of the vulva perianal area and upper thighs. We did discuss with her avoiding scratching the areas I will only incur further itching. She is also using some cold packs to help reduce the sensation. She is return to the office in 1 month to recheck vulva and we consider her treatment options for stress urinary incontinence.     2)  POP:  Absent.  (--) vaginal bleeding. (--) vaginal discharge. (--) sexually active.  (--) dyspareunia.  (+)  Vaginal dryness.  (--) vaginal estrogen use.   --POP-Q:  Aa -1; Ba -1; C -5; Ap -1; Bp -; D -6.  Genital hiatus 4, perineal body 2, total vaginal length 10-11.    3)  BM:  (--) constipation/straining.  (--) chronic diarrhea. (--) hematochezia.  (--) fecal incontinence.  (--) fecal smearing/urgency.  (+) complete evacuation.      4)  Vulvar irritation:  --using hydrocortisone cream daily  --scratches a lot  --uses ice packs  --using domeboro (aluminum and calcium) solution to soak  --has tried vaseline and EVVO  --thinks this is from staying wet with urine    05/26/2023  1)  Mixed urinary incontinence, urge < stress:    --denies UI  --voiding every 6 hours  --voiding 1/ night  --taking mirabegron    2)vaginal atrophy  -- using vaginal estrogen cream twice weekly     3)midline cystocele/ rectocele  --using pessary  --denies pain, bleeding, or  discharge    4)vulvar itching  -- 12/2021 hgba1c 7    09/21/2023  Taking myrbetriq  Has had more UI over the past few weeks  Using vaginal estrogen cream    06/27/2023  Stopped myrbetriq 4 weeks ago-- voiding every 5-6 hours during the day. Denies UI  Denies bleeding or discharge  Denies constipation  Using vaginal estrogen cream    Changes since last visit (last visit 12/26/2024)  Rare RUDY.  Voiding every 4 hours during the day -- if not will have mild UUI.  Did not start gemtesa She is having dysuria over the past week. Treated for uti a few weeks ago.  Denies bleeding or discharge-- not using vaginal estrogen cream  Denies constipation         Past Medical History:   Diagnosis Date    History of colonic polyps 01/29/2021 11/19 [facility] - Findings: 2 sessile polyps hepatic flexure and rectosigmoid      Hypertension     Mood disorder 01/29/2021    Uncontrolled type 2 diabetes mellitus with hyperglycemia 01/29/2021   --B knee OA: uses wheelchair; has trouble straightening knees; is not able to walk much at all     --mood disorder: h/o situational depression (mother was killed by drunk )--resolved; followed by psych ([facility]); takes effexor XR; currently stable     --DM2:    Lab Results   Component Value Date    HGBA1C 7.4 (H) 01/29/2025   --has been as high as 13%+ (could not have cataract surgery 8/2022)--now 7 on oral agents; no secondary disease; ? Mild B foot neuropathy    History reviewed. No pertinent surgical history.  Xlap/Pfannenstiel for removal of benign/congenital tumor--L fallopian tube was looped around this    Family History   Problem Relation Name Age of Onset    Vision loss Mother      Diabetes Father      Cancer Father      Hearing loss Maternal Aunt      Hearing loss Maternal Grandmother      Stroke Paternal Grandmother         Social History     Socioeconomic History    Marital status:    Tobacco Use    Smoking status: Never    Smokeless tobacco: Never   Substance and Sexual Activity     Drug use: Not Currently     Social Drivers of Health     Financial Resource Strain: Low Risk  (1/28/2025)    Received from [facility]    Overall Financial Resource Strain (CARDIA)     Difficulty of Paying Living Expenses: Not very hard   Food Insecurity: Food Insecurity Present (1/28/2025)    Received from [facility]    Hunger Vital Sign     Worried About Running Out of Food in the Last Year: Sometimes true     Ran Out of Food in the Last Year: Sometimes true   Transportation Needs: No Transportation Needs (1/28/2025)    Received from [facility]    PRAPARE - Transportation     Lack of Transportation (Medical): No     Lack of Transportation (Non-Medical): No   Physical Activity: Unknown (1/28/2025)    Received from [facility]    Exercise Vital Sign     Days of Exercise per Week: 0 days   Stress: Stress Concern Present (1/28/2025)    Received from [facility]    [employer] [place] [employer] - Occupational Stress Questionnaire     Feeling of Stress : To some extent   Housing Stability: Low Risk  (7/14/2022)    Received from [facility]    Housing Stability Vital Sign     Unable to Pay for Housing in the Last Year: No     Number of Places Lived in the Last Year: 1     Unstable Housing in the Last Year: No       Current Outpatient Medications   Medication Sig Dispense Refill    ALPRAZolam (XANAX) 1 MG tablet Take 1 mg by mouth 3 (three) times daily as needed.      estradioL (ESTRACE) 0.01 % (0.1 mg/gram) vaginal cream Place 1 g vaginally twice a week. APPLY DIME-SIZED AMOUNT    WITH FINGER IN             VAGINA/AROUND EXTERNAL     PARTS NIGHTLY. 42.5 g 4    ferrous fumarate-vitamin C 200 mg (65 mg iron)-25 mg TbSR Take by mouth.      glipiZIDE (GLUCOTROL) 10 MG tablet Take 1 tablet (10 mg total) by mouth 2 (two) times daily with meals. 180 tablet 3    metFORMIN (GLUCOPHAGE) 500 MG tablet 2 pills BID2 pills  tablet 3    omeprazole (PRILOSEC) 10 MG capsule Take 2 capsules (20 mg total) by mouth 2  (two) times daily before meals. 120 capsule 11    phenazopyridine (PYRIDIUM) 200 MG tablet Take 1 tablet (200 mg total) by mouth 3 (three) times daily as needed for Pain (bladder pain). 15 tablet 0    pioglitazone (ACTOS) 45 MG tablet Take 1 tablet (45 mg total) by mouth once daily. 90 tablet 3    venlafaxine (EFFEXOR-XR) 150 MG Cp24 Take  3 at night 270 capsule 3    vibegron (GEMTESA) 75 mg Tab Take 1 tablet (75 mg total) by mouth once daily. 30 tablet 11    vibegron (GEMTESA) 75 mg Tab Take 1 tablet (75 mg total) by mouth once daily. 30 tablet 11    vibegron (GEMTESA) 75 mg Tab Take 1 tablet (75 mg total) by mouth once daily. 90 tablet 4     No current facility-administered medications for this visit.       Review of patient's allergies indicates:  No Known Allergies    Well woman:  Pap test:  09/2023 normal HPV negative  10/2022 normal hpv 16, other high risk positive  Colpo: Cxbx/ecc  1. CERVICAL BIOPSY AT 6 O'CLOCK SHOWS SMALL DETACHED FRAGMENTS OF SQUAMOUS   EPITHELIUM WITH MILD DYSPLASIA (CIN1) AND INFLAMMATION.   2. ENDOCERVICAL CURETTAGE SHOWS DETACHED FRAGMENTS OF SQUAMOUS EPITHELIUM   MILD DYSPLASIA AND KOILOCYTOTIC ATYPIA (CIN1).   09/2023 normal hpv negative History of abnormal paps: No.  History of STIs:  No  Mammogram: 07/2024 normal  Colonoscopy: Date of last: 2019.  Result:  Findings: 2 sessile polyps hepatic flexure and rectosigmoid.  Repeat due:  2029.    DEXA:  needs to schedule    ROS:  As per HPI.      Exam  /76 (BP Location: Left arm, Patient Position: Sitting)   Pulse 79   Ht 6' (1.829 m)   Wt (!) 144 kg (317 lb 7.4 oz)   LMP  (LMP Unknown)   BMI 43.06 kg/m²   General: alert and oriented, no acute distress  Respiratory: normal respiratory effort  Abd: soft, non-tender, non-distended; +rectus diastasis, +umbilical hernia      Pelvic  Ext. Genitalia: normal external genitalia. Normal bartholin's and skeens glands.  Mild irritation in skin folds  Vagina: + atrophy. Normal vaginal  mucosa without lesions. No discharge noted.   Non-tender bladder base without palpable mass.  #3 ring with support pessary in place  Cervix: no lesions  Uterus:  uterus is normal size, shape, consistency and nontender   Urethra: no masses or tenderness  Urethral meatus: no lesions, caruncle or prolapse.      Pessary removed without difficulty. Washed with soap and water. Reinserted without difficulty.     Cath urine specimen obtained    Impression  1. Urge incontinence  vibegron (GEMTESA) 75 mg Tab    Urine Culture High Risk      2. Vaginal atrophy  estradioL (ESTRACE) 0.01 % (0.1 mg/gram) vaginal cream      3. Cystocele, midline        4. Rectocele, female        5. Pessary maintenance              We reviewed the above issues and discussed options for short-term versus long-term management of her problems.   Plan:   1)  Vulvar irritation:  --MOSTLY resolved at this time  --nystatin cream twice daily as needed  --use estrogen cream on outer labia     2)  Mixed urinary incontinence, urge < stress:    --continue to control diabetes: A1c 5.5%  --continue to work on weight loss  --Empty bladder every 2-3 hours.  Empty well: wait a minute, lean forward on toilet.    --Avoid dietary irritants (see sheet).  Keep diary x 3-5 days to determine your irritants.  --could not start pelvic floor PT. [facility] [place]. Has cataracts and trouble driving.   --Hold off for now  --URGE trial gemtesa 75 mg daily  ---STRESS:  Pessary vs. Sling                      --need recheck for anterior prolapse before any surgery  --urine culture  --urine micro            3) cystocele/ rectocele  --for now continue #3 ring with support pessary  --consider surgical correction (A&P repair if possible since uterus seems supported) in future   --if uterus not well-supported, could do TVH--high risk if need to convert to LSC or open   --need to repeat pap/HPV in 12/2023    --if still +, could we do LEEP instead? (Will ask ONC)   --need to do  office cystometry to see if need sling    --make sure A1c <7.5%   --need to get clearance per PCP + labs (A1c, CBC, CMP, T&S)/EKG    4) well-woman:  --10/2022 pap normal/HPV 16+  --12/2022 colposcopy  Colposcopy Site:  Cervix  Position:  Supine  Acrowhite Lesion: No    Atypical Vessels: No    Transformation Zone Adequate?: No    Biopsy?: Yes         Location:  Cervix ((6 00))  ECC Performed?: Yes (tenaculum used to hold cervix still)    LEEP Performed?: No    Estimated blood loss (cc):  0  1. CERVICAL BIOPSY AT 6 O'CLOCK SHOWS SMALL DETACHED FRAGMENTS OF SQUAMOUS   EPITHELIUM WITH MILD DYSPLASIA (CIN1) AND INFLAMMATION.   2. ENDOCERVICAL CURETTAGE SHOWS DETACHED FRAGMENTS OF SQUAMOUS EPITHELIUM   MILD DYSPLASIA AND KOILOCYTOTIC ATYPIA (CIN1).   --continue to control diabetes  --repeat pap/HPV normal     4)RTC for pessary check in 3 months    I spent a total of 20 minutes on the day of the visit.  This includes face to face time and non-face to face time preparing to see the patient (eg, review of tests), obtaining and/or reviewing separately obtained history, documenting clinical information in the electronic or other health record, independently interpreting results and communicating results to the patient/family/caregiver, or care coordinator.     JASON Kelly-BC [facility]  Division of Female Pelvic Medicine and Reconstructive Surgery  Department of Obstetrics & Gynecology

## 2025-04-02 ENCOUNTER — RESULTS FOLLOW-UP (OUTPATIENT)
Dept: UROGYNECOLOGY | Facility: CLINIC | Age: 70
End: 2025-04-02

## 2025-04-02 LAB — BACTERIA UR CULT: ABNORMAL

## 2025-04-24 ENCOUNTER — TELEPHONE (OUTPATIENT)
Dept: ADMINISTRATIVE | Facility: CLINIC | Age: 70
End: 2025-04-24
Payer: MEDICARE

## 2025-04-24 NOTE — TELEPHONE ENCOUNTER
Called pt; informed pt I was calling to make sure pt's 5/1/25 home visit for pt's eawv appt still worked; pt stated she would like to cancel the appt and declined to reschedule; pt stated to please make note she declines eawv appt for the year and not to call her back in regards to eawv appt.

## 2025-06-11 ENCOUNTER — PATIENT MESSAGE (OUTPATIENT)
Dept: ADMINISTRATIVE | Facility: CLINIC | Age: 70
End: 2025-06-11
Payer: MEDICARE

## 2025-06-18 ENCOUNTER — PATIENT MESSAGE (OUTPATIENT)
Dept: ADMINISTRATIVE | Facility: CLINIC | Age: 70
End: 2025-06-18
Payer: MEDICARE

## 2025-06-30 ENCOUNTER — TELEPHONE (OUTPATIENT)
Dept: UROGYNECOLOGY | Facility: CLINIC | Age: 70
End: 2025-06-30
Payer: MEDICARE

## 2025-06-30 NOTE — TELEPHONE ENCOUNTER
Informed pt I'll give her a call back once I consult with NP AMY Schmidt to reschedule today's appt . Pt viced understanding and call ended

## 2025-07-28 NOTE — PROGRESS NOTES
Urogyn follow up  06/30/2025  .  [facility] - UROGYNECOLOGY  [address] [address]    Sandi Dobson  [id number]  1955      Sandi Dobson is a 70 y.o. here for a urogyn follow up of mixed incontinence and pessary check     Last HPI from 10/20/2022     1)  UI:  (+) RUDY (bend, cough, sneeze) = (+) UUI  X years, worsening. Taking mirabegron 25 mg -- unsure if helps. Tried PFPT ([place]) in 2020.    (--) pads--should be. Sleeps in lazy boy recliner, has a dog pad underneath her--has a few drops of urine. Also has pad in wheelchair. Leaks with transferring.  Daytime frequency: Q 2 hours. Tries to hold at least that.   Nocturia: Yes: 1/night.   (--) dysuria,  (--) hematuria,  (+) reported frequent UTIs. Sees PCP--sends in ABX/pyridium without testing. Not sure C&S are sent.  (--) complete bladder emptying. +PV urgency/hesitancy. DV with small 2nd volume.      --9/2022 [facility] ([facility]):  presents for follow up with acute vulvitis, stress urinary incontinence and overactive bladder.. Patient is being currently treated for chronic and acute vulvitis and vulvar pruritus. She has been using RO soaks and topical steroids. She noticed only mild improvement and continues to have itching and scratching.    And also presents for follow-up of her urodynamics performed on August 22nd. The study revealed a normally sensate bladder of normal capacity and compliance. She did exhibit stress urinary incontinence at a fill of 392 cc with Valsalva leak point pressures of 27 and 34. She voids by detrusor contraction to completion with a postvoid residual of 60 cc. The bladder was stable throughout there was no evidence of DO or DI.      1. Overactive bladder  Reviewed urodynamics study with the patient which failed to reveal detrusor overactivity or detrusor overactivity incontinence.    2. 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 We discussed treatment options including possible mid urethral sling with bulking or staged approach with bulking later. Reviewed the nature and technique of the procedure, recovery, and potential risks and complications. Recommend a staged approach to avoid urinary retention. Patient is given literature on mid urethral sling and on bulking procedure.    3. Vulvar pruritus  Patient will continue to use the birth soaks in the topical hydrocortisone as well as her Vaseline mildly it barrier an effort to remove the condition of the skin of the vulva perianal area and upper thighs. We did discuss with her avoiding scratching the areas I will only incur further itching. She is also using some cold packs to help reduce the sensation. She is return to the office in 1 month to recheck vulva and we consider her treatment options for stress urinary incontinence.     2)  POP:  Absent.  (--) vaginal bleeding. (--) vaginal discharge. (--) sexually active.  (--) dyspareunia.  (+)  Vaginal dryness.  (--) vaginal estrogen use.   --POP-Q:  Aa -1; Ba -1; C -5; Ap -1; Bp -; D -6.  Genital hiatus 4, perineal body 2, total vaginal length 10-11.    3)  BM:  (--) constipation/straining.  (--) chronic diarrhea. (--) hematochezia.  (--) fecal incontinence.  (--) fecal smearing/urgency.  (+) complete evacuation.      4)  Vulvar irritation:  --using hydrocortisone cream daily  --scratches a lot  --uses ice packs  --using domeboro (aluminum and calcium) solution to soak  --has tried vaseline and EVVO  --thinks this is from staying wet with urine    05/26/2023  1)  Mixed urinary incontinence, urge < stress:    --denies UI  --voiding every 6 hours  --voiding 1/ night  --taking mirabegron    2)vaginal atrophy  -- using vaginal estrogen cream twice weekly     3)midline cystocele/ rectocele  --using pessary  --denies pain, bleeding, or  discharge    4)vulvar itching  -- 12/2021 hgba1c 7    09/21/2023  Taking myrbetriq  Has had more UI over the past few weeks  Using vaginal estrogen cream    06/27/2023  Stopped myrbetriq 4 weeks ago-- voiding every 5-6 hours during the day. Denies UI  Denies bleeding or discharge  Denies constipation  Using vaginal estrogen cream    Changes since last visit (last visit 03/31/2025)  Rare RUDY.  Voiding every 4 hours during the day -- if not will have mild UUI.   Denies bleeding or discharge-- not using vaginal estrogen cream  Denies constipation  Taking [employer] cranberry       Past Medical History:   Diagnosis Date    History of colonic polyps 01/29/2021 11/19 [facility] - Findings: 2 sessile polyps hepatic flexure and rectosigmoid      Hypertension     Mood disorder 01/29/2021    Uncontrolled type 2 diabetes mellitus with hyperglycemia 01/29/2021   --B knee OA: uses wheelchair; has trouble straightening knees; is not able to walk much at all     --mood disorder: h/o situational depression (mother was killed by drunk )--resolved; followed by psych (Radha); takes effexor XR; currently stable     --DM2:    Lab Results   Component Value Date    HGBA1C 7.4 (H) 01/29/2025   --has been as high as 13%+ (could not have cataract surgery 8/2022)--now 7 on oral agents; no secondary disease; ? Mild B foot neuropathy    History reviewed. No pertinent surgical history.  Xlap/Pfannenstiel for removal of benign/congenital tumor--L fallopian tube was looped around thisc    Family History   Problem Relation Name Age of Onset    Vision loss Mother      Diabetes Father      Cancer Father      Hearing loss Maternal Aunt      Hearing loss Maternal Grandmother      Stroke Paternal Grandmother         Social History     Socioeconomic History    Marital status:    Tobacco Use    Smoking status: Never    Smokeless tobacco: Never   Substance and Sexual Activity    Drug use: Not Currently     Social Drivers of Health      Financial Resource Strain: Low Risk  (1/28/2025)    Received from [facility] [place]    Overall Financial Resource Strain (CARDIA)     Difficulty of Paying Living Expenses: Not very hard   Food Insecurity: Food Insecurity Present (1/28/2025)    Received from [facility]    Hunger Vital Sign     Worried About Running Out of Food in the Last Year: Sometimes true     Ran Out of Food in the Last Year: Sometimes true   Transportation Needs: No Transportation Needs (1/28/2025)    Received from [facility] [place]    PRAPARE - Transportation     Lack of Transportation (Medical): No     Lack of Transportation (Non-Medical): No   Physical Activity: Unknown (1/28/2025)    Received from [facility] [place]    Exercise Vital Sign     Days of Exercise per Week: 0 days   Stress: Stress Concern Present (1/28/2025)    Received from [facility]    [employer] - Occupational Stress Questionnaire     Feeling of Stress : To some extent   Housing Stability: Low Risk  (7/14/2022)    Received from [facility] [place]    Housing Stability Vital Sign     Unable to Pay for Housing in the Last Year: No     Number of Places Lived in the Last Year: 1     Unstable Housing in the Last Year: No       Current Outpatient Medications   Medication Sig Dispense Refill    estradioL (ESTRACE) 0.01 % (0.1 mg/gram) vaginal cream Place 1 g vaginally twice a week. APPLY DIME-SIZED AMOUNT    WITH FINGER IN             VAGINA/AROUND EXTERNAL     PARTS NIGHTLY. 42.5 g 4    ferrous fumarate-vitamin C 200 mg (65 mg iron)-25 mg TbSR Take by mouth.      glipiZIDE (GLUCOTROL) 10 MG tablet Take 1 tablet (10 mg total) by mouth 2 (two) times daily with meals. 180 tablet 3    metFORMIN (GLUCOPHAGE) 500 MG tablet 2 pills BID2 pills  tablet 3    omeprazole (PRILOSEC) 10 MG capsule Take 2 capsules (20 mg total) by mouth 2 (two) times daily before meals. 120 capsule 11    pioglitazone (ACTOS) 45 MG tablet Take 1 tablet (45 mg total) by mouth once daily. 90 tablet 3     venlafaxine (EFFEXOR-XR) 150 MG Cp24 Take  3 at night 270 capsule 3    vibegron (GEMTESA) 75 mg Tab Take 1 tablet (75 mg total) by mouth once daily. 30 tablet 11    ALPRAZolam (XANAX) 1 MG tablet Take 1 mg by mouth 3 (three) times daily as needed.       No current facility-administered medications for this visit.       Review of patient's allergies indicates:  No Known Allergies    Well woman:  Pap test:  09/2023 normal HPV negative  10/2022 normal hpv 16, other high risk positive  Colpo: Cxbx/ecc  1. CERVICAL BIOPSY AT 6 O'CLOCK SHOWS SMALL DETACHED FRAGMENTS OF SQUAMOUS   EPITHELIUM WITH MILD DYSPLASIA (CIN1) AND INFLAMMATION.   2. ENDOCERVICAL CURETTAGE SHOWS DETACHED FRAGMENTS OF SQUAMOUS EPITHELIUM   MILD DYSPLASIA AND KOILOCYTOTIC ATYPIA (CIN1).   09/2023 normal hpv negative History of abnormal paps: No.  History of STIs:  No  Mammogram: 07/2024 normal  Colonoscopy: Date of last: 2019.  Result:  Findings: 2 sessile polyps hepatic flexure and rectosigmoid.  Repeat due:  2029.    DEXA:  needs to schedule    ROS:  As per HPI.      Exam  /67 (BP Location: Right arm, Patient Position: Sitting)   Pulse 78   Ht 6' (1.829 m)   LMP  (LMP Unknown)   BMI 43.06 kg/m²   General: alert and oriented, no acute distress  Respiratory: normal respiratory effort  Abd: soft, non-tender, non-distended; +rectus diastasis, +umbilical hernia      Pelvic  Ext. Genitalia: normal external genitalia. Normal bartholin's and skeens glands.  Mild irritation in skin folds  Vagina: + atrophy. Normal vaginal mucosa without lesions. No discharge noted.   Non-tender bladder base without palpable mass.  #3 ring with support pessary in place  Cervix: no lesions  Uterus:  uterus is normal size, shape, consistency and nontender   Urethra: no masses or tenderness  Urethral meatus: no lesions, caruncle or prolapse.      Pessary removed without difficulty. Washed with soap and water. Reinserted without difficulty.     Cath urine specimen  obtained    Impression  1. Urge incontinence        2. Cystocele, midline        3. Rectocele, female        4. Pessary maintenance        5. Vaginal atrophy        6. Frequent UTI                We reviewed the above issues and discussed options for short-term versus long-term management of her problems.   Plan:   1)  Vulvar irritation:  --MOSTLY resolved at this time  --nystatin cream twice daily as needed  --use estrogen cream on outer labia     2)  Mixed urinary incontinence, urge < stress:    --continue to control diabetes: A1c 5.5%  --continue to work on weight loss  --Empty bladder every 2-3 hours.  Empty well: wait a minute, lean forward on toilet.    --Avoid dietary irritants (see sheet).  Keep diary x 3-5 days to determine your irritants.  --could not start pelvic floor PT. [facility] is closed. Has cataracts and trouble driving.   --Hold off for now  --URGE continue gemtesa 75 mg daily  ---STRESS:  Pessary vs. Sling                      --need recheck for anterior prolapse before any surgery  --other cranberry supplements: NOW (probiotics/cranberry/probiotic), theracran, ellura            3) cystocele/ rectocele  --for now continue #3 ring with support pessary  --consider surgical correction (A&P repair if possible since uterus seems supported) in future   --if uterus not well-supported, could do TVH--high risk if need to convert to LSC or open   --need to repeat pap/HPV in 12/2023    --if still +, could we do LEEP instead? (Will ask ONC)   --need to do office cystometry to see if need sling    --make sure A1c <7.5%   --need to get clearance per PCP + labs (A1c, CBC, CMP, T&S)/EKG    4) well-woman:  --10/2022 pap normal/HPV 16+  --12/2022 colposcopy  Colposcopy Site:  Cervix  Position:  Supine  Acrowhite Lesion: No    Atypical Vessels: No    Transformation Zone Adequate?: No    Biopsy?: Yes         Location:  Cervix ((6 00))  ECC Performed?: Yes (tenaculum used to hold cervix still)    LEEP  Performed?: No    Estimated blood loss (cc):  0  1. CERVICAL BIOPSY AT 6 O'CLOCK SHOWS SMALL DETACHED FRAGMENTS OF SQUAMOUS   EPITHELIUM WITH MILD DYSPLASIA (CIN1) AND INFLAMMATION.   2. ENDOCERVICAL CURETTAGE SHOWS DETACHED FRAGMENTS OF SQUAMOUS EPITHELIUM   MILD DYSPLASIA AND KOILOCYTOTIC ATYPIA (CIN1).   --continue to control diabetes  --repeat pap/HPV normal     4)RTC for pessary check in 3 months    I spent a total of 20 minutes on the day of the visit.  This includes face to face time and non-face to face time preparing to see the patient (eg, review of tests), obtaining and/or reviewing separately obtained history, documenting clinical information in the electronic or other health record, independently interpreting results and communicating results to the patient/family/caregiver, or care coordinator.     JASON Kelly-BC  [facility]  Division of Female Pelvic Medicine and Reconstructive Surgery  Department of Obstetrics & Gynecology

## 2025-07-29 ENCOUNTER — OFFICE VISIT (OUTPATIENT)
Dept: UROGYNECOLOGY | Facility: CLINIC | Age: 70
End: 2025-07-29
Payer: COMMERCIAL

## 2025-07-29 VITALS
SYSTOLIC BLOOD PRESSURE: 113 MMHG | HEART RATE: 78 BPM | DIASTOLIC BLOOD PRESSURE: 67 MMHG | BODY MASS INDEX: 43.06 KG/M2 | HEIGHT: 72 IN

## 2025-07-29 DIAGNOSIS — N81.6 RECTOCELE, FEMALE: ICD-10-CM

## 2025-07-29 DIAGNOSIS — N95.2 VAGINAL ATROPHY: ICD-10-CM

## 2025-07-29 DIAGNOSIS — Z46.89 PESSARY MAINTENANCE: ICD-10-CM

## 2025-07-29 DIAGNOSIS — N81.11 CYSTOCELE, MIDLINE: ICD-10-CM

## 2025-07-29 DIAGNOSIS — N39.41 URGE INCONTINENCE: Primary | ICD-10-CM

## 2025-07-29 DIAGNOSIS — N39.0 FREQUENT UTI: ICD-10-CM

## 2025-07-29 PROCEDURE — 1126F AMNT PAIN NOTED NONE PRSNT: CPT | Mod: CPTII,S$GLB,, | Performed by: NURSE PRACTITIONER

## 2025-07-29 PROCEDURE — 3288F FALL RISK ASSESSMENT DOCD: CPT | Mod: CPTII,S$GLB,, | Performed by: NURSE PRACTITIONER

## 2025-07-29 PROCEDURE — 1159F MED LIST DOCD IN RCRD: CPT | Mod: CPTII,S$GLB,, | Performed by: NURSE PRACTITIONER

## 2025-07-29 PROCEDURE — 99999 PR PBB SHADOW E&M-EST. PATIENT-LVL III: CPT | Mod: PBBFAC,,, | Performed by: NURSE PRACTITIONER

## 2025-07-29 PROCEDURE — 3078F DIAST BP <80 MM HG: CPT | Mod: CPTII,S$GLB,, | Performed by: NURSE PRACTITIONER

## 2025-07-29 PROCEDURE — 1160F RVW MEDS BY RX/DR IN RCRD: CPT | Mod: CPTII,S$GLB,, | Performed by: NURSE PRACTITIONER

## 2025-07-29 PROCEDURE — 3074F SYST BP LT 130 MM HG: CPT | Mod: CPTII,S$GLB,, | Performed by: NURSE PRACTITIONER

## 2025-07-29 PROCEDURE — 3051F HG A1C>EQUAL 7.0%<8.0%: CPT | Mod: CPTII,S$GLB,, | Performed by: NURSE PRACTITIONER

## 2025-07-29 PROCEDURE — 1101F PT FALLS ASSESS-DOCD LE1/YR: CPT | Mod: CPTII,S$GLB,, | Performed by: NURSE PRACTITIONER

## 2025-07-29 PROCEDURE — 3008F BODY MASS INDEX DOCD: CPT | Mod: CPTII,S$GLB,, | Performed by: NURSE PRACTITIONER

## 2025-07-29 PROCEDURE — 99213 OFFICE O/P EST LOW 20 MIN: CPT | Mod: S$GLB,,, | Performed by: NURSE PRACTITIONER

## 2025-07-29 NOTE — PATIENT INSTRUCTIONS
1)  Vulvar irritation:  --MOSTLY resolved at this time  --nystatin cream twice daily as needed  --use estrogen cream on outer labia     2)  Mixed urinary incontinence, urge < stress:    --continue to control diabetes: A1c 5.5%  --continue to work on weight loss  --Empty bladder every 2-3 hours.  Empty well: wait a minute, lean forward on toilet.    --Avoid dietary irritants (see sheet).  Keep diary x 3-5 days to determine your irritants.  --could not start pelvic floor PT. [facility] location is closed. Has cataracts and trouble driving.   --Hold off for now  --URGE continue gemtesa 75 mg daily  ---STRESS:  Pessary vs. Sling                      --need recheck for anterior prolapse before any surgery  --other cranberry supplements: NOW (probiotics/cranberry/probiotic), theracran, ellura            3) cystocele/ rectocele  --for now continue #3 ring with support pessary  --consider surgical correction (A&P repair if possible since uterus seems supported) in future   --if uterus not well-supported, could do TVH--high risk if need to convert to LSC or open   --need to repeat pap/HPV in 12/2023    --if still +, could we do LEEP instead? (Will ask ONC)   --need to do office cystometry to see if need sling    --make sure A1c <7.5%   --need to get clearance per PCP + labs (A1c, CBC, CMP, T&S)/EKG    4) well-woman:  --10/2022 pap normal/HPV 16+  --12/2022 colposcopy  Colposcopy Site:  Cervix  Position:  Supine  Acrowhite Lesion: No    Atypical Vessels: No    Transformation Zone Adequate?: No    Biopsy?: Yes         Location:  Cervix ((6 00))  ECC Performed?: Yes (tenaculum used to hold cervix still)    LEEP Performed?: No    Estimated blood loss (cc):  0  1. CERVICAL BIOPSY AT 6 O'CLOCK SHOWS SMALL DETACHED FRAGMENTS OF SQUAMOUS   EPITHELIUM WITH MILD DYSPLASIA (CIN1) AND INFLAMMATION.   2. ENDOCERVICAL CURETTAGE SHOWS DETACHED FRAGMENTS OF SQUAMOUS EPITHELIUM   MILD DYSPLASIA AND KOILOCYTOTIC ATYPIA (CIN1).   --continue to  control diabetes  --repeat pap/HPV normal     4)RTC for pessary check in 3 months

## 2025-08-29 LAB — BCS RECOMMENDATION EXT: NORMAL

## 2025-09-03 ENCOUNTER — PATIENT OUTREACH (OUTPATIENT)
Dept: ADMINISTRATIVE | Facility: HOSPITAL | Age: 70
End: 2025-09-03
Payer: MEDICARE

## 2025-09-03 DIAGNOSIS — E11.9 TYPE 2 DIABETES MELLITUS WITHOUT COMPLICATION, WITHOUT LONG-TERM CURRENT USE OF INSULIN: Primary | ICD-10-CM

## 2025-09-03 DIAGNOSIS — M81.0 OSTEOPOROSIS, UNSPECIFIED OSTEOPOROSIS TYPE, UNSPECIFIED PATHOLOGICAL FRACTURE PRESENCE: ICD-10-CM
